# Patient Record
Sex: FEMALE | NOT HISPANIC OR LATINO | Employment: FULL TIME | URBAN - NONMETROPOLITAN AREA
[De-identification: names, ages, dates, MRNs, and addresses within clinical notes are randomized per-mention and may not be internally consistent; named-entity substitution may affect disease eponyms.]

---

## 2018-01-31 ENCOUNTER — DOCUMENTATION ONLY (OUTPATIENT)
Dept: FAMILY MEDICINE | Facility: OTHER | Age: 48
End: 2018-01-31

## 2019-10-18 ENCOUNTER — OFFICE VISIT (OUTPATIENT)
Dept: FAMILY MEDICINE | Facility: OTHER | Age: 49
End: 2019-10-18
Attending: NURSE PRACTITIONER

## 2019-10-18 VITALS
SYSTOLIC BLOOD PRESSURE: 149 MMHG | RESPIRATION RATE: 20 BRPM | HEART RATE: 97 BPM | DIASTOLIC BLOOD PRESSURE: 88 MMHG | HEIGHT: 65 IN | OXYGEN SATURATION: 98 % | WEIGHT: 210.7 LBS | BODY MASS INDEX: 35.1 KG/M2 | TEMPERATURE: 97.1 F

## 2019-10-18 DIAGNOSIS — R21 RASH: Primary | ICD-10-CM

## 2019-10-18 DIAGNOSIS — R03.0 ELEVATED BLOOD PRESSURE READING: ICD-10-CM

## 2019-10-18 LAB
SPECIMEN SOURCE: NORMAL
STREP GROUP A PCR: NOT DETECTED

## 2019-10-18 PROCEDURE — 99214 OFFICE O/P EST MOD 30 MIN: CPT | Performed by: PHYSICIAN ASSISTANT

## 2019-10-18 PROCEDURE — 87651 STREP A DNA AMP PROBE: CPT | Mod: ZL | Performed by: PHYSICIAN ASSISTANT

## 2019-10-18 ASSESSMENT — PATIENT HEALTH QUESTIONNAIRE - PHQ9: SUM OF ALL RESPONSES TO PHQ QUESTIONS 1-9: 10

## 2019-10-18 ASSESSMENT — MIFFLIN-ST. JEOR: SCORE: 1579.73

## 2019-10-18 NOTE — PATIENT INSTRUCTIONS
Rash to chest and neck  Discussed consideration of strep, contact dermatitis, allergy reaction  Strep PCR is pending we will call with results and recommendations  For rash - symptomatic treatments - moisturize, apply cool compress, avoid heat  OTC cetirizine 10 mg oral tablet, take once to twice daily, alternately can use benadryl as directed on label  OTC hydrocortisone to affected areas twice daily for up to 14 days  Elevated BP on exam today   For elevated BP will have you follow up in clinic for a recheck of rash and BP in 1 week  Seek immediate care for     Fever of 100.4 F (38 C) or higher, or as directed    Symptoms that don t get better, or get worse    New symptoms    Patient Education     Understanding Contact Dermatitis     A cool, moist compress can help reduce itching.     Contact dermatitis is a common type of skin rash. It s caused by something that touches the skin and makes it irritated and inflamed. It can occur on skin on any part of the body, such as the face, neck, hands, arms, and legs. Contact dermatitis is not spread from person to person.  Often, the reaction of contact dermatitis occurs 1 to 2 days after contact with the offending agent.  How to say it  ROBIN-tact [id number]   What causes contact dermatitis?  It s caused by something that irritates the skin, or that creates an allergic reaction on the skin. People can get contact dermatitis from many kinds of things. These include:    Plant oils in poison ivy, oak, and sumac    Chemicals in household , solvents, and glue    Chemicals in makeup, soap, laundry detergent, perfume, acne cream, and hair products    Certain medicines, such as neomycin, bacitracin, benzocaine, and thimerosal    Metals such as nickel, found in some jewelry and watch bands     The sticky material on the back of bandages and tape (adhesive)    Things that can cause tiny breaks in the skin, such as wood, fiberglass, metal tools, and plant thorns    Rubber  latex in surgical gloves and other medical supplies  Dermatitis can also be caused by the skin being damp for long periods of time. This can happen from washing your hands too often, or working with wet materials.  Symptoms of contact dermatitis  Symptoms can include skin that is:    Blistered    Burning    Cracked    Dry    Itchy    Painful    Red    Rough, thickened, and leathery    Swollen    Warm  The blisters may ooze fluid and form crusts.  Treatment for contact dermatitis  Treatment is done to help relieve itching and reduce inflammation. The rash should go away in a few days to a few weeks. Treatments include:    Cool, moist compress. Use a clean damp cloth. Put it on the area for 20 to 30 minutes, 5 to 6 times a day for the first 3 days.    Steroid cream or ointment. You can apply this medicine several times a day on clean skin.    Oral corticosteroid. Your healthcare provider may prescribe this medicine if you have severe skin symptoms on a large part of your body.  Your healthcare provider may give you a steroid injection instead of pills.    Oral antihistamine. This medicine can help reduce itching.    Colloidal oatmeal bath. Soaking in water with colloidal oatmeal can help soothe skin.    Plain cream, lotion, or ointment. Cream, lotion, or ointment without medicine can help to soothe and protect your skin.  Living with contact dermatitis  Talk with your healthcare provider about what may have caused your contact dermatitis. Patch testing may help you figure out what caused the rash so you can avoid further contact with it. Once you learn what caused your rash, make sure to avoid that substance. If your skin comes into contact with it again, make sure to wash your skin right away. If you can t avoid the substance, wear gloves or other protective clothing before you touch it. Or use a cream, lotion, or ointment to protect your skin.  When to call your healthcare provider  Call your healthcare provider  right away if you have any of these:    Fever of 100.4 F (38 C) or higher, or as directed    Symptoms that don t get better, or get worse    New symptoms   Date Last Reviewed: 5/1/2016 2000-2018 [employer]. [address] [place] [address], [place] [address]. All rights reserved. This information is not intended as a substitute for professional medical care. Always follow your healthcare professional's instructions.           Patient Education     What is High Blood Pressure?  High blood pressure (also called hypertension) is known as the  silent killer.  This is because most of the time it doesn t cause symptoms. In fact, many people don t know they have it until other problems develop. In most cases, high blood pressure often requires lifelong treatment.  Understanding blood pressure  The circulatory system is made up of the heart and blood vessels that carry blood through the body. Your heart is the pump for this system. With each heartbeat (contraction), the heart sends blood out through large blood vessels called arteries. Blood pressure is a measure of how hard the moving blood pushes against the walls of the arteries.  High blood pressure can harm your health  In a healthy blood vessel, the blood moves smoothly through the vessel and puts normal pressure on the vessel walls.       High blood pressure occurs when blood pushes too hard against artery walls. This causes damage to the artery walls and then the formation of scar tissue as it heals. This makes the arteries stiff and weak. Plaque sticks to the scarred tissue narrowing and hardening the arteries. High blood pressure also causes your heart to work harder to get blood out to the body. High blood pressure raises your risk of heart attack, also known as acute myocardial infarction, or AMI, heart failure, and stroke. It can also lead to kidney disease, and blindness. In general, if you have high blood pressure, keeping your blood pressure below  130/80 mmHg may help prevent these problems. Your healthcare provider may prescribe medicine to help control blood pressure if lifestyle changes are not enough.  It's important to know your blood pressure numbers. Blood pressure measurements are given as 2 numbers. Systolic blood pressure is the upper number. This is the pressure when the heart contracts. Diastolic blood pressure is the lower number. This is the pressure when the heart relaxes between beats.  Blood pressure is categorized as normal, elevated, or stage 1 or stage 2 high blood pressure:    Normal blood pressure is systolic of less than 120 and diastolic of less than 80 (120/80)    Elevated blood pressure is systolic of 120 to 129 and diastolic less than 80    Stage 1 high blood pressure is systolic is 130 to 139 or diastolic between 80 to 89    Stage 2 high blood pressure is when systolic is 140 or higher or the diastolic is 90 or higher  High blood pressure is diagnosed when multiple, separate readings show blood pressure above 130/80 mmHg. Talk with your healthcare provider if you have questions or concerns about your blood pressure readings.  Measuring blood pressure  An example of a blood pressure measurement is 120/70 (120 over 70). The top number is the pressure of blood against the artery walls during a heartbeat (systolic). The bottom number is the pressure of blood against artery walls between heartbeats (diastolic). Talk with your healthcare provider to find out what your blood pressure goals should be.   Controlling blood pressure  If your blood pressure is too high, work with your doctor on a plan for lowering it. Below are steps you can take that will help lower your blood pressure.    Choose heart-healthy foods. Eating healthier meals helps you control your blood pressure. Ask your doctor about the DASH eating plan. This plan helps reduce blood pressure by limiting the amount of sodium (salt) you have in your diet. DASH also encourages  "eating plenty of fruits and vegetables, low-fat or non-fat dairy, whole-grains, and foods high in fiber, and low in fat. This also provides an enhanced amount of potassium which can also help lower blood pressure.    Reduce sodium. Reducing sodium in your diet reduces fluid retention. Fluid retention caused by too much salt increases blood volume and blood pressure. The American Heart Association s (AHA) \"ideal\" sodium intake recommendation is 1,500 milligrams per day.  However, since American's eat so much salt, the AHA says a positive change can occur by cutting back to even 2,400 milligrams of sodium a day.    Maintain a healthy weight. Being overweight makes you more likely to have high blood pressure. Losing excess weight helps lower blood pressure.    Exercise regularly. Daily exercise helps your heart and blood vessels work better and stay healthier. It can help lower your blood pressure.    Stop smoking. Smoking increases blood pressure and damages blood vessels.    Limit alcohol. Drinking too much alcohol can raise blood pressure. Men should have no more than 2 drinks a day. Women should have no more than 1. (A drink is equal to 1 beer, or a small glass of wine, or a shot of liquor.)    Control stress. Stress makes your heart work harder and beat faster. Controlling stress helps you control your blood pressure.  Facts about high blood pressure    Feeling OK does not mean that blood pressure is under control. Likewise, feeling bad doesn t mean it s out of control. The only way to know for sure is to check your pressure regularly.    Medicine is only one part of controlling high blood pressure. You also need to manage your weight, get regular exercise, and adjust your eating habits.    High blood pressure is usually a lifelong problem. But it can be controlled with healthy lifestyle changes and medicine.    Hypertension is not the same as stress. Although stress may be a factor in high blood pressure, it s " only one part of the story.    Blood pressure medicines need to be taken every day. Stopping suddenly may cause a dangerous increase in pressure.   Date Last Reviewed: 4/27/2016 2000-2018 [employer]. [address] [place] [address], [place] [address]. All rights reserved. This information is not intended as a substitute for professional medical care. Always follow your healthcare professional's instructions.

## 2019-10-18 NOTE — NURSING NOTE
"Chief Complaint   Patient presents with     Derm Problem   Rash on chest since Tuesday.      Initial BP (!) 168/90   Pulse 97   Temp 97.1  F (36.2  C) (Tympanic)   Resp 20   Ht 1.64 m (5' 4.57\")   Wt 95.6 kg (210 lb 11.2 oz)   LMP 07/04/2019   SpO2 98%   BMI 35.53 kg/m   Estimated body mass index is 35.53 kg/m  as calculated from the following:    Height as of this encounter: 1.64 m (5' 4.57\").    Weight as of this encounter: 95.6 kg (210 lb 11.2 oz).  Medication Reconciliation: complete    Letha Jackson LPN  "

## 2019-10-18 NOTE — PROGRESS NOTES
"HPI:    Julia Weaver is a 49 year old female who presents to clinic today for evaluation of rash on her chest/neck  Onset 4 days ago, course is unchanged  Associated symptoms: erythematous rash, itchy. Dry skin in the crease of her nose  Precipitating: nothing she can think of as far as exposures or changes in eating, medications, supplements.  She does wear her blue tooth head phones around her neck, she has been using these for many months without reaction  No known exposure to new cosmetics, chemicals, foods, medications, supplements, plants, pets, environments    She has some dizziness in AM's.   BP is elevated. Patient does not take anything to treat this. She had a similar episode of high Blood pressure 3 years ago.     Past Medical History:   Diagnosis Date     Personal history of other medical treatment (CODE)     No Comments Provided       Past Surgical History:   Procedure Laterality Date     DENTAL SURGERY      No Comments Provided     TONSILLECTOMY, ADENOIDECTOMY, COMBINED      No Comments Provided     TYMPANOSTOMY, LOCAL/TOPICAL ANESTHESIA      No Comments Provided         No current outpatient medications on file.       No Known Allergies    ROS:  General: feels well, no fever  Skin: POSITIVE for rash per HPI  HENT: negative  Respiratory: negative  Abdomen: negative  Musculoskleletal: negative  Neurological: negative    EXAM:  Vitals:    10/18/19 1431 10/18/19 1525   BP: (!) 168/90 (!) 149/88   Pulse: 97    Resp: 20    Temp: 97.1  F (36.2  C)    TempSrc: Tympanic    SpO2: 98%    Weight: 95.6 kg (210 lb 11.2 oz)    Height: 1.64 m (5' 4.57\")      General appearance: well appearing female, in no acute distress  Head: normocephalic, atraumatic  Ears: TM's with cone of light, no erythema, canals clear bilaterally  Eyes: conjunctivae normal, SIGRID, EOMI  Nose: dry skin paranasal, mild congestion. No sinus tenderness  Orophayrnx: moist mucous membranes, moderate oral pharynx erythema, s/p tonsillectomy  Neck: " supple with mild adenopathy  Respiratory: clear to auscultation bilaterally  Cardiac: RRR with no murmurs  Dermatological: erythematous rash on chest and neck with small 1-2 mm scattered erythematous macules and papules  Psychological: normal affect, alert and pleasant    Results for orders placed or performed in visit on 10/18/19   Group A Streptococcus PCR Throat Swab   Result Value Ref Range    Specimen Description Throat     Strep Group A PCR Not Detected NDET^Not Detected         ASSESSMENT AND PLAN:    1. Rash    2. Elevated blood pressure reading      Rash to chest and neck  Discussed consideration of strep, contact dermatitis, allergy reaction, heat rash  Throat was also erythematous so obtained Strep PCR  No known new exposures foods, cosmetics, lotions medications etc. She does wear her head phones around her neck - but this is not a new change.   Strep PCR is negative, notified through nursing pool today  Will treat rash symptomatically -  moisturize, apply cool compress, avoid heat  OTC cetirizine 10 mg oral tablet, take once to twice daily, alternately can use benadryl as directed on label  OTC hydrocortisone to affected areas twice daily for up to 14 days  Elevated BP on exam today, on recheck this came down to 149/88  For elevated BP, patient has a blood pressure cuff at home and will check her BP 3 x weekly. Discussed goal of < 140/<90.   Follow up in clinic for a recheck of rash and BP in 1 week if symptoms persist or worsen  Patient received verbal and written instruction including review of warning signs    Estrella Krishnamurthy PA-C on 10/18/2019 at 5:55 PM    Estrella Krishnamurthy PA-C on 12/6/2019 at 10:19 AM

## 2019-11-26 ENCOUNTER — RESULTS ONLY (OUTPATIENT)
Dept: LAB | Age: 49
End: 2019-11-26

## 2019-11-26 LAB
ALBUMIN UR-MCNC: NEGATIVE MG/DL
ANION GAP SERPL CALCULATED.3IONS-SCNC: 10 MMOL/L (ref 3–14)
APPEARANCE UR: CLEAR
BASOPHILS # BLD AUTO: 0.1 10E9/L (ref 0–0.2)
BASOPHILS NFR BLD AUTO: 0.6 %
BILIRUB UR QL STRIP: NEGATIVE
BUN SERPL-MCNC: 10 MG/DL (ref 7–25)
CALCIUM SERPL-MCNC: 9.5 MG/DL (ref 8.6–10.3)
CHLORIDE SERPL-SCNC: 99 MMOL/L (ref 98–107)
CHOLEST SERPL-MCNC: 289 MG/DL
CO2 SERPL-SCNC: 28 MMOL/L (ref 21–31)
COLOR UR AUTO: YELLOW
CREAT SERPL-MCNC: 0.85 MG/DL (ref 0.6–1.2)
DIFFERENTIAL METHOD BLD: NORMAL
EOSINOPHIL # BLD AUTO: 0.2 10E9/L (ref 0–0.7)
EOSINOPHIL NFR BLD AUTO: 1.9 %
ERYTHROCYTE [DISTWIDTH] IN BLOOD BY AUTOMATED COUNT: 13.9 % (ref 10–15)
GFR SERPL CREATININE-BSD FRML MDRD: 71 ML/MIN/{1.73_M2}
GLUCOSE SERPL-MCNC: 148 MG/DL (ref 70–105)
GLUCOSE UR STRIP-MCNC: NEGATIVE MG/DL
HCT VFR BLD AUTO: 42.5 % (ref 35–47)
HDLC SERPL-MCNC: 44 MG/DL (ref 23–92)
HGB BLD-MCNC: 14.5 G/DL (ref 11.7–15.7)
HGB UR QL STRIP: NEGATIVE
IMM GRANULOCYTES # BLD: 0 10E9/L (ref 0–0.4)
IMM GRANULOCYTES NFR BLD: 0.4 %
KETONES UR STRIP-MCNC: NEGATIVE MG/DL
LDLC SERPL CALC-MCNC: 185 MG/DL
LEUKOCYTE ESTERASE UR QL STRIP: NEGATIVE
LYMPHOCYTES # BLD AUTO: 1.5 10E9/L (ref 0.8–5.3)
LYMPHOCYTES NFR BLD AUTO: 18.9 %
MCH RBC QN AUTO: 30.1 PG (ref 26.5–33)
MCHC RBC AUTO-ENTMCNC: 34.1 G/DL (ref 31.5–36.5)
MCV RBC AUTO: 88 FL (ref 78–100)
MONOCYTES # BLD AUTO: 0.6 10E9/L (ref 0–1.3)
MONOCYTES NFR BLD AUTO: 7.7 %
NEUTROPHILS # BLD AUTO: 5.5 10E9/L (ref 1.6–8.3)
NEUTROPHILS NFR BLD AUTO: 70.5 %
NITRATE UR QL: NEGATIVE
NONHDLC SERPL-MCNC: 245 MG/DL
PH UR STRIP: 7 PH (ref 5–9)
PLATELET # BLD AUTO: 232 10E9/L (ref 150–450)
POTASSIUM SERPL-SCNC: 3.5 MMOL/L (ref 3.5–5.1)
RBC # BLD AUTO: 4.82 10E12/L (ref 3.8–5.2)
SODIUM SERPL-SCNC: 137 MMOL/L (ref 134–144)
SOURCE: NORMAL
SP GR UR STRIP: <1.005 (ref 1–1.03)
TRIGL SERPL-MCNC: 301 MG/DL
UROBILINOGEN UR STRIP-ACNC: 0.2 EU/DL (ref 0.2–1)
WBC # BLD AUTO: 7.8 10E9/L (ref 4–11)

## 2020-03-11 ENCOUNTER — HEALTH MAINTENANCE LETTER (OUTPATIENT)
Age: 50
End: 2020-03-11

## 2020-12-27 ENCOUNTER — HEALTH MAINTENANCE LETTER (OUTPATIENT)
Age: 50
End: 2020-12-27

## 2021-03-06 ENCOUNTER — HEALTH MAINTENANCE LETTER (OUTPATIENT)
Age: 51
End: 2021-03-06

## 2021-03-09 ENCOUNTER — MEDICAL CORRESPONDENCE (OUTPATIENT)
Dept: HEALTH INFORMATION MANAGEMENT | Facility: OTHER | Age: 51
End: 2021-03-09

## 2021-03-09 ENCOUNTER — APPOINTMENT (OUTPATIENT)
Dept: LAB | Facility: OTHER | Age: 51
End: 2021-03-09
Attending: FAMILY MEDICINE

## 2021-03-09 ENCOUNTER — RESULTS ONLY (OUTPATIENT)
Dept: LAB | Age: 51
End: 2021-03-09

## 2021-03-09 LAB
ALBUMIN SERPL-MCNC: 4.8 G/DL (ref 3.5–5.7)
ALP SERPL-CCNC: 97 U/L (ref 34–104)
ALT SERPL W P-5'-P-CCNC: 28 U/L (ref 7–52)
ANION GAP SERPL CALCULATED.3IONS-SCNC: 10 MMOL/L (ref 3–14)
AST SERPL W P-5'-P-CCNC: 13 U/L (ref 13–39)
BILIRUB SERPL-MCNC: 0.6 MG/DL (ref 0.3–1)
BUN SERPL-MCNC: 15 MG/DL (ref 7–25)
CALCIUM SERPL-MCNC: 10.1 MG/DL (ref 8.6–10.3)
CHLORIDE SERPL-SCNC: 96 MMOL/L (ref 98–107)
CHOLEST SERPL-MCNC: 330 MG/DL
CO2 SERPL-SCNC: 27 MMOL/L (ref 21–31)
CREAT SERPL-MCNC: 0.89 MG/DL (ref 0.6–1.2)
GFR SERPL CREATININE-BSD FRML MDRD: 67 ML/MIN/{1.73_M2}
GLUCOSE SERPL-MCNC: 295 MG/DL (ref 70–105)
HBA1C MFR BLD: 8.7 % (ref 4–6)
HDLC SERPL-MCNC: 50 MG/DL (ref 23–92)
LDLC SERPL CALC-MCNC: 220 MG/DL
NONHDLC SERPL-MCNC: 280 MG/DL
POTASSIUM SERPL-SCNC: 4.1 MMOL/L (ref 3.5–5.1)
PROT SERPL-MCNC: 7.9 G/DL (ref 6.4–8.9)
SODIUM SERPL-SCNC: 133 MMOL/L (ref 134–144)
TRIGL SERPL-MCNC: 301 MG/DL

## 2021-03-10 DIAGNOSIS — E11.9 TYPE 2 DIABETES MELLITUS WITHOUT COMPLICATION, WITHOUT LONG-TERM CURRENT USE OF INSULIN (H): Primary | ICD-10-CM

## 2021-04-25 ENCOUNTER — HEALTH MAINTENANCE LETTER (OUTPATIENT)
Age: 51
End: 2021-04-25

## 2021-06-19 ENCOUNTER — HEALTH MAINTENANCE LETTER (OUTPATIENT)
Age: 51
End: 2021-06-19

## 2021-09-14 ENCOUNTER — APPOINTMENT (OUTPATIENT)
Dept: LAB | Facility: OTHER | Age: 51
End: 2021-09-14
Attending: FAMILY MEDICINE

## 2021-09-14 ENCOUNTER — LAB REQUISITION (OUTPATIENT)
Dept: LAB | Facility: OTHER | Age: 51
End: 2021-09-14

## 2021-09-14 DIAGNOSIS — E11.9 TYPE 2 DIABETES MELLITUS WITHOUT COMPLICATIONS (H): ICD-10-CM

## 2021-09-14 LAB — HBA1C MFR BLD: 6.1 % (ref 4–6.2)

## 2021-09-14 PROCEDURE — 83036 HEMOGLOBIN GLYCOSYLATED A1C: CPT | Performed by: FAMILY MEDICINE

## 2021-09-14 PROCEDURE — 36415 COLL VENOUS BLD VENIPUNCTURE: CPT | Performed by: FAMILY MEDICINE

## 2021-10-09 ENCOUNTER — HEALTH MAINTENANCE LETTER (OUTPATIENT)
Age: 51
End: 2021-10-09

## 2021-11-30 ENCOUNTER — LAB REQUISITION (OUTPATIENT)
Dept: LAB | Facility: OTHER | Age: 51
End: 2021-11-30

## 2021-11-30 LAB
ANION GAP SERPL CALCULATED.3IONS-SCNC: 9 MMOL/L (ref 3–14)
BUN SERPL-MCNC: 15 MG/DL (ref 7–25)
CALCIUM SERPL-MCNC: 10.2 MG/DL (ref 8.6–10.3)
CHLORIDE BLD-SCNC: 99 MMOL/L (ref 98–107)
CHOLEST SERPL-MCNC: 215 MG/DL
CO2 SERPL-SCNC: 28 MMOL/L (ref 21–31)
CREAT SERPL-MCNC: 0.84 MG/DL (ref 0.6–1.2)
FASTING STATUS PATIENT QL REPORTED: NO
GFR SERPL CREATININE-BSD FRML MDRD: 81 ML/MIN/1.73M2
GLUCOSE BLD-MCNC: 128 MG/DL (ref 70–105)
HBA1C MFR BLD: 7 % (ref 4–6.2)
HDLC SERPL-MCNC: 51 MG/DL (ref 23–92)
LDLC SERPL CALC-MCNC: 122 MG/DL
NONHDLC SERPL-MCNC: 164 MG/DL
POTASSIUM BLD-SCNC: 3.8 MMOL/L (ref 3.5–5.1)
SODIUM SERPL-SCNC: 136 MMOL/L (ref 134–144)
TRIGL SERPL-MCNC: 212 MG/DL

## 2021-11-30 PROCEDURE — 83036 HEMOGLOBIN GLYCOSYLATED A1C: CPT | Performed by: FAMILY MEDICINE

## 2021-11-30 PROCEDURE — 36415 COLL VENOUS BLD VENIPUNCTURE: CPT | Performed by: FAMILY MEDICINE

## 2021-11-30 PROCEDURE — 80061 LIPID PANEL: CPT | Performed by: FAMILY MEDICINE

## 2021-11-30 PROCEDURE — 80048 BASIC METABOLIC PNL TOTAL CA: CPT | Performed by: FAMILY MEDICINE

## 2022-03-26 ENCOUNTER — HEALTH MAINTENANCE LETTER (OUTPATIENT)
Age: 52
End: 2022-03-26

## 2022-05-21 ENCOUNTER — HEALTH MAINTENANCE LETTER (OUTPATIENT)
Age: 52
End: 2022-05-21

## 2022-07-16 ENCOUNTER — HEALTH MAINTENANCE LETTER (OUTPATIENT)
Age: 52
End: 2022-07-16

## 2022-08-29 ENCOUNTER — ALLIED HEALTH/NURSE VISIT (OUTPATIENT)
Dept: FAMILY MEDICINE | Facility: OTHER | Age: 52
End: 2022-08-29
Payer: COMMERCIAL

## 2022-08-29 DIAGNOSIS — Z23 NEED FOR VACCINATION: Primary | ICD-10-CM

## 2022-08-29 DIAGNOSIS — Z23 ENCOUNTER FOR IMMUNIZATION: Primary | ICD-10-CM

## 2022-08-29 PROCEDURE — 90471 IMMUNIZATION ADMIN: CPT

## 2022-08-29 PROCEDURE — 90715 TDAP VACCINE 7 YRS/> IM: CPT

## 2022-08-29 NOTE — PROGRESS NOTES
Pt is coming in this afternoon for a Boostrix injection. Pt has no records of immunizations available. Pt was last seen with Dr. Raymond. Dr. Raymond is out today, sending request to provider in [facility] [address] for review. Thank you     Yasmine Duffy RN on 8/29/2022 at 10:37 AM

## 2022-08-29 NOTE — PROGRESS NOTES
Immunization Documentation  Verified patient's first and last name, and . Stated reason for visit today is to receive BROOSTRIX vaccine. Accompained to clinic visit with SELF. Denied any concerns with previous immunizations. Allergies reviewed. VIS handout(s) reviewed and given to take home. VACCINES prepared and administered per standing order. Administration documented in IMMUNIZATIONS (see flowsheet and order for further information).  Instructed to wait in lobby for 15 minutes post-injection and notify staff immediately of any reaction.     Yasmine Duffy RN ....................  2022   3:47 PM

## 2022-09-04 ENCOUNTER — OFFICE VISIT (OUTPATIENT)
Dept: FAMILY MEDICINE | Facility: OTHER | Age: 52
End: 2022-09-04
Attending: FAMILY MEDICINE
Payer: COMMERCIAL

## 2022-09-04 VITALS
RESPIRATION RATE: 18 BRPM | TEMPERATURE: 98 F | WEIGHT: 185.38 LBS | OXYGEN SATURATION: 97 % | SYSTOLIC BLOOD PRESSURE: 120 MMHG | HEART RATE: 92 BPM | DIASTOLIC BLOOD PRESSURE: 66 MMHG | BODY MASS INDEX: 31.26 KG/M2

## 2022-09-04 DIAGNOSIS — H01.135 ECZEMATOUS DERMATITIS OF UPPER AND LOWER EYELIDS OF BOTH EYES: Primary | ICD-10-CM

## 2022-09-04 DIAGNOSIS — H01.131 ECZEMATOUS DERMATITIS OF UPPER AND LOWER EYELIDS OF BOTH EYES: Primary | ICD-10-CM

## 2022-09-04 DIAGNOSIS — H01.134 ECZEMATOUS DERMATITIS OF UPPER AND LOWER EYELIDS OF BOTH EYES: Primary | ICD-10-CM

## 2022-09-04 DIAGNOSIS — H01.132 ECZEMATOUS DERMATITIS OF UPPER AND LOWER EYELIDS OF BOTH EYES: Primary | ICD-10-CM

## 2022-09-04 PROCEDURE — 99213 OFFICE O/P EST LOW 20 MIN: CPT | Performed by: FAMILY MEDICINE

## 2022-09-04 RX ORDER — LISINOPRIL 10 MG/1
10 TABLET ORAL DAILY
COMMUNITY
Start: 2022-07-11 | End: 2022-10-12

## 2022-09-04 RX ORDER — TRIAMCINOLONE ACETONIDE 1 MG/G
CREAM TOPICAL 2 TIMES DAILY
Qty: 15 G | Refills: 1 | Status: SHIPPED | OUTPATIENT
Start: 2022-09-04 | End: 2022-10-28

## 2022-09-04 RX ORDER — ROSUVASTATIN CALCIUM 10 MG/1
10 TABLET, COATED ORAL DAILY
COMMUNITY
Start: 2022-07-11 | End: 2022-10-14

## 2022-09-04 ASSESSMENT — PAIN SCALES - GENERAL: PAINLEVEL: NO PAIN (0)

## 2022-09-04 NOTE — NURSING NOTE
Patient presents to clinic experiencing red, itchy, swollen bilateral eyes since this morning.    Medication Rec Complete  Ronel Bundy LPN............9/4/2022 1:42 PM

## 2022-09-04 NOTE — PROGRESS NOTES
(H01.131,  H01.132,  H01.135,  H01.134) Eczematous dermatitis of upper and lower eyelids of both eyes  (primary encounter diagnosis)  Comment:   Plan: triamcinolone (KENALOG) 0.1 % external cream              CHIEF COMPLAINT    Redness of eyelids.      HISTORY    Patient is developed eyelid redness bilaterally over the last 2 to 3 days.  She states that her skin feels kind of tight and dry in these areas.  She tried a topical over-the-counter product which did not help too much.    She is not noticing redness of the conjunctiva or mattering of her eyes.  She has a little rash along the necklace line also.    It sounds like she has a history of somewhat sensitive skin.      REVIEW OF SYSTEMS    Unremarkable except as above.      EXAM  /66 (BP Location: Right arm, Patient Position: Sitting, Cuff Size: Adult Regular)   Pulse 92   Temp 98  F (36.7  C) (Tympanic)   Resp 18   Wt 84.1 kg (185 lb 6 oz)   LMP  (LMP Unknown)   SpO2 97%   Breastfeeding No   BMI 31.26 kg/m      Eyelid dermatitis involving both eyes, upper and lower lids with redness and a little bit of scaliness.  No vesicles or pustules.    Conjunctiva is quiet.

## 2022-09-17 ENCOUNTER — HEALTH MAINTENANCE LETTER (OUTPATIENT)
Age: 52
End: 2022-09-17

## 2022-10-12 DIAGNOSIS — I10 BENIGN ESSENTIAL HYPERTENSION: Primary | ICD-10-CM

## 2022-10-12 NOTE — TELEPHONE ENCOUNTER
[id number] of [place] sent Rx request for the following:    Pt out of medication.     Requested Prescriptions   Pending Prescriptions Disp Refills     lisinopril (ZESTRIL) 10 MG tablet [Pharmacy Med Name: LISINOPRIL 10MG TABLET] 90 tablet 4     Sig: TAKE 1 TABLET BY MOUTH DAILY   Historically reported.    Last Office Visit:              7/2/16   Future Office visit:           None    Called and spoke to Patient after verifying last name and date of birth. Pt states she has been without health insurance and has been receiving prescriptions through [facility]. Pt recently got insurance and would like to establish care with Imelda Sexton. Pt states she took her last pill this morning and verbalized plan to reach out to [id number], for emergency supply. Pt transferred to scheduling, to set up appointment. Pt requesting refill to get her through until appointment:    Next 5 appointments (look out 90 days)    Oct 28, 2022  7:40 AM  Office Visit with Imelda Sexton PA-C  [facility] ([facility] ) [address]  [place] [address]  [phone]        Ronel Oneil RN .............. 10/12/2022  2:29 PM

## 2022-10-13 DIAGNOSIS — I10 BENIGN ESSENTIAL HYPERTENSION: ICD-10-CM

## 2022-10-13 DIAGNOSIS — E11.9 TYPE 2 DIABETES MELLITUS WITHOUT COMPLICATION, UNSPECIFIED WHETHER LONG TERM INSULIN USE (H): Primary | ICD-10-CM

## 2022-10-13 RX ORDER — LISINOPRIL 10 MG/1
TABLET ORAL
Qty: 30 TABLET | Refills: 0 | Status: SHIPPED | OUTPATIENT
Start: 2022-10-13 | End: 2022-10-28

## 2022-10-14 RX ORDER — ROSUVASTATIN CALCIUM 10 MG/1
TABLET, COATED ORAL
Qty: 30 TABLET | Refills: 0 | Status: SHIPPED | OUTPATIENT
Start: 2022-10-14 | End: 2022-10-28

## 2022-10-14 NOTE — TELEPHONE ENCOUNTER
[id number] of [place] sent Rx request for the following:      Requested Prescriptions   Pending Prescriptions Disp Refills     rosuvastatin (CRESTOR) 10 MG tablet [Pharmacy Med Name: ROSUVASTATIN 10MG TABLET] 90 tablet 4     Sig: TAKE 1 TABLET BY MOUTH EVERY DAY   Historically reported    Last Office Visit:              7/2/16   Future Office visit:           None     Called and spoke to Patient after verifying last name and date of birth. Pt states she has been without health insurance and has been receiving prescriptions through [facility]. Pt recently got insurance and would like to establish care with Imelda Sexton. Pt states she took her last pill this morning and verbalized plan to reach out to [id number], for emergency supply. Pt transferred to scheduling, to set up appointment. Pt requesting refill to get her through until appointment:         Next 5 appointments (look out 90 days)    Oct 28, 2022  7:40 AM  Office Visit with Imelda Sexton PA-C  [facility] ([facility] ) [address]  [place] [address]  [phone]     Ronel Oneil RN .............. 10/14/2022  2:49 PM

## 2022-10-27 NOTE — PROGRESS NOTES
Assessment & Plan     1. Routine history and physical examination of adult  Updated preventative exams as outlined below, Pap smear pending.  Declined updating immunizations.  Vitals and exam stable.  Encouraged healthy diet and exercise.    2. Cervical cancer screening  Pap smear pending, notify the results.  - Pap Screen Thin Prep    3. Type 2 diabetes mellitus without complication, unspecified whether long term insulin use (H)  Under very poor control.  A1c returned at 11.2 today.  We will need to significantly increase metformin dose over the next few weeks.  Subsequently ending up on 1000 twice daily.  Recheck in 1 month or sooner if needed.  Recheck A1c in 3 months and if still elevated may need to add additional medications.  Patient will also focus on significant healthy lifestyle changes in the meantime.  - Basic Metabolic Panel; Future  - Hemoglobin A1c; Future  - Lipid Panel; Future  - Albumin Random Urine Quantitative with Creat Ratio; Future  - Basic Metabolic Panel  - Hemoglobin A1c  - Lipid Panel  - Albumin Random Urine Quantitative with Creat Ratio  - rosuvastatin (CRESTOR) 10 MG tablet; Take 1 tablet (10 mg) by mouth daily  Dispense: 90 tablet; Refill: 3  - metFORMIN (GLUCOPHAGE) 500 MG tablet; Start with 500 mg once daily X 1 week, increase to 500 mg twice daily X 1 week, then 1000 mg twice daily  Dispense: 120 tablet; Refill: 3    4. Benign essential hypertension  Well-controlled, refilled lisinopril as below.  - lisinopril (ZESTRIL) 10 MG tablet; Take 1 tablet (10 mg) by mouth daily  Dispense: 90 tablet; Refill: 3    5. Chronic right-sided low back pain with right-sided sciatica  Lumbar x-rays showing mild arthritis change. Options discussed including PT, additional evaluation, medication management, etc. Patient interested in packet of at home stretches/exercises which was given. If minimal improvement consider PT or additional evaluation of symptoms.   - XR Lumbar Spine 2/3 Views;  Future    6. Encounter for screening mammogram for breast cancer  - MA Screen Bilateral w/Remigio; Future    7. Special screening for malignant neoplasms, colon  - COLOGUARD([employer])      Return in about 4 weeks (around 11/25/2022), or if symptoms worsen or fail to improve.    Imelda Sexton PA-C  [facility]    Subjective   Julia is a 52 year old, presenting for the following health issues:  Physical      Healthy Habits:     Getting at least 3 servings of Calcium per day:  NO    Bi-annual eye exam:  Yes    Dental care twice a year:  NO    Sleep apnea or symptoms of sleep apnea:  None    Diet:  Other    Duration of exercise:  Other    Taking medications regularly:  No    Barriers to taking medications:  Other    Medication side effects:  Other    PHQ-2 Total Score: 0    Additional concerns today:  No     Here for annual physical.  Patient is due for updating lab work due to history of hypertension, diabetes, elevated cholesterol.  Continues on metformin once daily for diabetic management.  Most recent A1c completed 1 year ago and was 7.0.  Patient reports that she has prescribed metformin 500 mg twice daily but currently takes only 250 mg once daily.  Does take the lisinopril and rosuvastatin as prescribed.  Has been working more towards healthy lifestyle.    Has also been struggling with some chronic right-sided low back pain that radiates down her right leg.  Does have some swelling in her right leg that is worse at the end of the day.  Reports that she has a history of degenerative disease in her back that was diagnosed many years ago.  Unable to review records, imaging.  Would like to update imaging today.  No associated fever/chills, numbness or tingling, saddle anesthesia, loss of bowel or bladder control, foot drop.    PAST MEDICAL HISTORY:   Past Medical History:   Diagnosis Date     Personal history of other medical treatment (CODE)     No Comments Provided       PAST SURGICAL  "HISTORY:   Past Surgical History:   Procedure Laterality Date     DENTAL SURGERY      No Comments Provided     TONSILLECTOMY, ADENOIDECTOMY, COMBINED      No Comments Provided     TYMPANOSTOMY, LOCAL/TOPICAL ANESTHESIA      No Comments Provided       FAMILY HISTORY: History reviewed. No pertinent family history.    SOCIAL HISTORY:   Social History     Tobacco Use     Smoking status: Former     Packs/day: 1.00     Types: Cigarettes     Smokeless tobacco: Never   Substance Use Topics     Alcohol use: No      No Known Allergies  Current Outpatient Medications   Medication     lisinopril (ZESTRIL) 10 MG tablet     metFORMIN (GLUCOPHAGE) 500 MG tablet     rosuvastatin (CRESTOR) 10 MG tablet     No current facility-administered medications for this visit.         Review of Systems   Constitutional: Negative for chills and fever.   HENT: Negative for congestion, ear pain, hearing loss and sore throat.    Eyes: Negative for pain and visual disturbance.   Respiratory: Negative for cough and shortness of breath.    Cardiovascular: Positive for peripheral edema. Negative for chest pain and palpitations.   Gastrointestinal: Positive for abdominal pain and constipation. Negative for diarrhea, heartburn, hematochezia and nausea.   Breasts:  Negative for tenderness, breast mass and discharge.   Genitourinary: Positive for frequency. Negative for dysuria, genital sores, hematuria, pelvic pain, urgency, vaginal bleeding and vaginal discharge.   Musculoskeletal: Positive for arthralgias and myalgias. Negative for joint swelling.   Skin: Negative for rash.   Neurological: Negative for dizziness, weakness, headaches and paresthesias.   Psychiatric/Behavioral: Positive for mood changes. The patient is not nervous/anxious.       Per HPI        Objective    /74   Pulse 105   Temp 97.2  F (36.2  C)   Resp 14   Ht 1.632 m (5' 4.25\")   Wt 80.4 kg (177 lb 3.2 oz)   LMP  (LMP Unknown)   SpO2 100%   Breastfeeding No   BMI 30.18 " kg/m    Body mass index is 30.18 kg/m .  Physical Exam   General: Pleasant, in no apparent distress.  Cardiovascular: Regular rate and rhythm with S1 equal to S2. No murmurs, friction rubs, or gallops.   Respiratory: Lungs are resonant and clear to auscultation bilaterally. No wheezes, crackles, or rhonchi.  Musculoskeletal: Back is straight, no tenderness to palpation of paraspinal and paravertebral muscles. Full ROM of back, neck, upper and lower extremities.  Genitourinary Exam Female: External genitalia, vulva and vagina are without lesions, masses, or ulcerations. Speculum exam reveals normal appearing cervix. Bimanual exam reveals normal uterus and adnexa with no masses, nontender urethra and bladder. No cervical motion tenderness. Pap smear obtained.   Neurologic Exam: Normal gross motor, tone coordination and no visible tremor.  Skin: No jaundice, pallor, rashes, or lesions.  Psych: Appropriate mood and affect.    Results for orders placed or performed in visit on 10/28/22   Extra Tube     Status: None    Narrative    The following orders were created for panel order Extra Tube.  Procedure                               Abnormality         Status                     ---------                               -----------         ------                     Extra Serum Separator Tu...[[id number]]                      Final result                 Please view results for these tests on the individual orders.   Extra Serum Separator Tube (SST)     Status: None   Result Value Ref Range    Hold Specimen [facility]    Basic Metabolic Panel     Status: Abnormal   Result Value Ref Range    Sodium 134 134 - 144 mmol/L    Potassium 4.4 3.5 - 5.1 mmol/L    Chloride 99 98 - 107 mmol/L    Carbon Dioxide (CO2) 27 21 - 31 mmol/L    Anion Gap 8 3 - 14 mmol/L    Urea Nitrogen 14 7 - 25 mg/dL    Creatinine 0.94 0.60 - 1.20 mg/dL    Calcium 9.5 8.6 - 10.3 mg/dL    Glucose 348 (H) 70 - 105 mg/dL    GFR Estimate 73 >60 mL/min/1.73m2    Hemoglobin A1c     Status: Abnormal   Result Value Ref Range    Hemoglobin A1C 11.2 (H) 4.0 - 6.2 %   Lipid Panel     Status: Abnormal   Result Value Ref Range    Cholesterol 161 <200 mg/dL    Triglycerides 169 (H) <150 mg/dL    Direct Measure HDL 30 23 - 92 mg/dL    LDL Cholesterol Calculated 97 <=100 mg/dL    Non HDL Cholesterol 131 (H) <130 mg/dL    Patient Fasting > 8hrs? Yes     Narrative    Cholesterol  Desirable:  <200 mg/dL    Triglycerides  Normal:  Less than 150 mg/dL  Borderline High:  150-199 mg/dL  High:  200-499 mg/dL  Very High:  Greater than or equal to 500 mg/dL    Direct Measure HDL  Female:  Greater than or equal to 50 mg/dL   Male:  Greater than or equal to 40 mg/dL    LDL Cholesterol  Desirable:  <100mg/dL  Above Desirable:  100-129 mg/dL   Borderline High:  130-159 mg/dL   High:  160-189 mg/dL   Very High:  >= 190 mg/dL    Non HDL Cholesterol  Desirable:  130 mg/dL  Above Desirable:  130-159 mg/dL  Borderline High:  160-189 mg/dL  High:  190-219 mg/dL  Very High:  Greater than or equal to 220 mg/dL

## 2022-10-28 ENCOUNTER — OFFICE VISIT (OUTPATIENT)
Dept: FAMILY MEDICINE | Facility: OTHER | Age: 52
End: 2022-10-28
Attending: PHYSICIAN ASSISTANT
Payer: COMMERCIAL

## 2022-10-28 ENCOUNTER — APPOINTMENT (OUTPATIENT)
Dept: LAB | Facility: OTHER | Age: 52
End: 2022-10-28
Attending: PHYSICIAN ASSISTANT
Payer: COMMERCIAL

## 2022-10-28 ENCOUNTER — HOSPITAL ENCOUNTER (OUTPATIENT)
Dept: GENERAL RADIOLOGY | Facility: OTHER | Age: 52
Discharge: HOME | End: 2022-10-28
Attending: PHYSICIAN ASSISTANT
Payer: COMMERCIAL

## 2022-10-28 VITALS
HEIGHT: 64 IN | SYSTOLIC BLOOD PRESSURE: 124 MMHG | HEART RATE: 105 BPM | BODY MASS INDEX: 30.25 KG/M2 | WEIGHT: 177.2 LBS | DIASTOLIC BLOOD PRESSURE: 74 MMHG | TEMPERATURE: 97.2 F | OXYGEN SATURATION: 100 % | RESPIRATION RATE: 14 BRPM

## 2022-10-28 DIAGNOSIS — M54.41 CHRONIC RIGHT-SIDED LOW BACK PAIN WITH RIGHT-SIDED SCIATICA: ICD-10-CM

## 2022-10-28 DIAGNOSIS — Z12.4 CERVICAL CANCER SCREENING: ICD-10-CM

## 2022-10-28 DIAGNOSIS — G89.29 CHRONIC RIGHT-SIDED LOW BACK PAIN WITH RIGHT-SIDED SCIATICA: ICD-10-CM

## 2022-10-28 DIAGNOSIS — Z00.00 ROUTINE HISTORY AND PHYSICAL EXAMINATION OF ADULT: Primary | ICD-10-CM

## 2022-10-28 DIAGNOSIS — E78.49 OTHER HYPERLIPIDEMIA: ICD-10-CM

## 2022-10-28 DIAGNOSIS — E11.9 TYPE 2 DIABETES MELLITUS WITHOUT COMPLICATION, WITHOUT LONG-TERM CURRENT USE OF INSULIN (H): ICD-10-CM

## 2022-10-28 DIAGNOSIS — I10 BENIGN ESSENTIAL HYPERTENSION: ICD-10-CM

## 2022-10-28 DIAGNOSIS — E11.9 TYPE 2 DIABETES MELLITUS WITHOUT COMPLICATION, UNSPECIFIED WHETHER LONG TERM INSULIN USE (H): ICD-10-CM

## 2022-10-28 DIAGNOSIS — Z12.31 ENCOUNTER FOR SCREENING MAMMOGRAM FOR BREAST CANCER: ICD-10-CM

## 2022-10-28 DIAGNOSIS — Z12.11 SPECIAL SCREENING FOR MALIGNANT NEOPLASMS, COLON: ICD-10-CM

## 2022-10-28 LAB
ANION GAP SERPL CALCULATED.3IONS-SCNC: 8 MMOL/L (ref 3–14)
BUN SERPL-MCNC: 14 MG/DL (ref 7–25)
CALCIUM SERPL-MCNC: 9.5 MG/DL (ref 8.6–10.3)
CHLORIDE BLD-SCNC: 99 MMOL/L (ref 98–107)
CHOLEST SERPL-MCNC: 161 MG/DL
CO2 SERPL-SCNC: 27 MMOL/L (ref 21–31)
CREAT SERPL-MCNC: 0.94 MG/DL (ref 0.6–1.2)
FASTING STATUS PATIENT QL REPORTED: YES
GFR SERPL CREATININE-BSD FRML MDRD: 73 ML/MIN/1.73M2
GLUCOSE BLD-MCNC: 348 MG/DL (ref 70–105)
HBA1C MFR BLD: 11.2 % (ref 4–6.2)
HDLC SERPL-MCNC: 30 MG/DL (ref 23–92)
HOLD SPECIMEN: NORMAL
LDLC SERPL CALC-MCNC: 97 MG/DL
NONHDLC SERPL-MCNC: 131 MG/DL
POTASSIUM BLD-SCNC: 4.4 MMOL/L (ref 3.5–5.1)
SODIUM SERPL-SCNC: 134 MMOL/L (ref 134–144)
TRIGL SERPL-MCNC: 169 MG/DL

## 2022-10-28 PROCEDURE — 99213 OFFICE O/P EST LOW 20 MIN: CPT | Mod: 25 | Performed by: PHYSICIAN ASSISTANT

## 2022-10-28 PROCEDURE — 82043 UR ALBUMIN QUANTITATIVE: CPT | Mod: ZL | Performed by: PHYSICIAN ASSISTANT

## 2022-10-28 PROCEDURE — 83036 HEMOGLOBIN GLYCOSYLATED A1C: CPT | Mod: ZL | Performed by: PHYSICIAN ASSISTANT

## 2022-10-28 PROCEDURE — 80061 LIPID PANEL: CPT | Mod: ZL | Performed by: PHYSICIAN ASSISTANT

## 2022-10-28 PROCEDURE — G0123 SCREEN CERV/VAG THIN LAYER: HCPCS | Performed by: PHYSICIAN ASSISTANT

## 2022-10-28 PROCEDURE — 36415 COLL VENOUS BLD VENIPUNCTURE: CPT | Mod: ZL | Performed by: PHYSICIAN ASSISTANT

## 2022-10-28 PROCEDURE — 82310 ASSAY OF CALCIUM: CPT | Mod: ZL | Performed by: PHYSICIAN ASSISTANT

## 2022-10-28 PROCEDURE — 87624 HPV HI-RISK TYP POOLED RSLT: CPT | Mod: ZL | Performed by: PHYSICIAN ASSISTANT

## 2022-10-28 PROCEDURE — 99396 PREV VISIT EST AGE 40-64: CPT | Performed by: PHYSICIAN ASSISTANT

## 2022-10-28 PROCEDURE — 72100 X-RAY EXAM L-S SPINE 2/3 VWS: CPT

## 2022-10-28 RX ORDER — LISINOPRIL 10 MG/1
10 TABLET ORAL DAILY
Qty: 90 TABLET | Refills: 3 | Status: SHIPPED | OUTPATIENT
Start: 2022-10-28 | End: 2023-02-16

## 2022-10-28 RX ORDER — ROSUVASTATIN CALCIUM 10 MG/1
10 TABLET, COATED ORAL DAILY
Qty: 90 TABLET | Refills: 3 | Status: SHIPPED | OUTPATIENT
Start: 2022-10-28 | End: 2023-02-16

## 2022-10-28 RX ORDER — LISINOPRIL 10 MG/1
10 TABLET ORAL DAILY
Qty: 90 TABLET | Refills: 3 | Status: CANCELLED | OUTPATIENT
Start: 2022-10-28

## 2022-10-28 RX ORDER — ROSUVASTATIN CALCIUM 10 MG/1
10 TABLET, COATED ORAL DAILY
Qty: 90 TABLET | Refills: 3 | Status: CANCELLED | OUTPATIENT
Start: 2022-10-28

## 2022-10-28 RX ORDER — ROSUVASTATIN CALCIUM 10 MG/1
10 TABLET, COATED ORAL DAILY
Qty: 30 TABLET | Refills: 0 | Status: SHIPPED | OUTPATIENT
Start: 2022-10-28 | End: 2022-10-28

## 2022-10-28 ASSESSMENT — ENCOUNTER SYMPTOMS
BREAST MASS: 0
CHILLS: 0
DYSURIA: 0
DIZZINESS: 0
COUGH: 0
PALPITATIONS: 0
HEMATURIA: 0
ARTHRALGIAS: 1
PARESTHESIAS: 0
MYALGIAS: 1
SORE THROAT: 0
CONSTIPATION: 1
FREQUENCY: 1
DIARRHEA: 0
HEARTBURN: 0
SHORTNESS OF BREATH: 0
NERVOUS/ANXIOUS: 0
EYE PAIN: 0
NAUSEA: 0
HEADACHES: 0
HEMATOCHEZIA: 0
ABDOMINAL PAIN: 1
JOINT SWELLING: 0
WEAKNESS: 0
FEVER: 0

## 2022-10-28 ASSESSMENT — PAIN SCALES - GENERAL: PAINLEVEL: NO PAIN (1)

## 2022-10-28 NOTE — NURSING NOTE
"Patient presents to clinic for annual physical.  Chief Complaint   Patient presents with     Physical       Medications reviewed:  No discrepancies identified.      Initial /74   Pulse 105   Temp 97.2  F (36.2  C)   Resp 14   Ht 1.632 m (5' 4.25\")   Wt 80.4 kg (177 lb 3.2 oz)   LMP  (LMP Unknown)   SpO2 100%   Breastfeeding No   BMI 30.18 kg/m   Estimated body mass index is 30.18 kg/m  as calculated from the following:    Height as of this encounter: 1.632 m (5' 4.25\").    Weight as of this encounter: 80.4 kg (177 lb 3.2 oz).  BP completed using cuff size:  nadira Carmona LPN ....................  10/28/2022   7:38 AM      "

## 2022-10-30 LAB
CREAT UR-MCNC: 154 MG/DL
MICROALBUMIN UR-MCNC: <12 MG/L
MICROALBUMIN/CREAT UR: NORMAL MG/G{CREAT}

## 2022-11-01 RX ORDER — LISINOPRIL 10 MG/1
TABLET ORAL
Qty: 30 TABLET | Refills: 0 | OUTPATIENT
Start: 2022-11-01

## 2022-11-01 NOTE — TELEPHONE ENCOUNTER
lisinopril (ZESTRIL) 10 MG tablet 90 tablet 3 10/28/2022  No   Sig - Route: Take 1 tablet (10 mg) by mouth daily -     To [facility]   Already refilled [facility]    Joanie Barkley RN on 11/1/2022 at 8:34 AM

## 2022-11-02 LAB
BKR LAB AP GYN ADEQUACY: NORMAL
BKR LAB AP GYN INTERPRETATION: NORMAL
BKR LAB AP HPV REFLEX: NORMAL
BKR LAB AP PREVIOUS ABNORMAL: NORMAL
PATH REPORT.COMMENTS IMP SPEC: NORMAL
PATH REPORT.COMMENTS IMP SPEC: NORMAL
PATH REPORT.RELEVANT HX SPEC: NORMAL

## 2022-11-08 LAB
HUMAN PAPILLOMA VIRUS 16 DNA: NEGATIVE
HUMAN PAPILLOMA VIRUS 18 DNA: NEGATIVE
HUMAN PAPILLOMA VIRUS FINAL DIAGNOSIS: NORMAL
HUMAN PAPILLOMA VIRUS OTHER HR: NEGATIVE

## 2022-11-11 LAB — NONINV COLON CA DNA+OCC BLD SCRN STL QL: NEGATIVE

## 2022-11-22 ENCOUNTER — HOSPITAL ENCOUNTER (OUTPATIENT)
Dept: MAMMOGRAPHY | Facility: OTHER | Age: 52
Discharge: HOME | End: 2022-11-22
Attending: PHYSICIAN ASSISTANT | Admitting: PHYSICIAN ASSISTANT
Payer: COMMERCIAL

## 2022-11-22 DIAGNOSIS — Z12.31 ENCOUNTER FOR SCREENING MAMMOGRAM FOR BREAST CANCER: ICD-10-CM

## 2022-11-22 PROCEDURE — 77067 SCR MAMMO BI INCL CAD: CPT

## 2023-02-15 NOTE — PROGRESS NOTES
Assessment & Plan     1. Type 2 diabetes mellitus without complication, unspecified whether long term insulin use (H)  2. Benign essential hypertension  Much improved with consistent use of metformin.  A1c down to 8.0.  Recommend increasing dose to 1000 mg twice daily going forward.  Continue with rosuvastatin and lisinopril, 90-day supply of each with refills sent to pharmacy again..  Follow-up for recheck in 3 months.  - Hemoglobin A1c; Future  - Hemoglobin A1c  - lisinopril (ZESTRIL) 10 MG tablet; Take 1 tablet (10 mg) by mouth daily  Dispense: 90 tablet; Refill: 3    3. Edema of right lower extremity  Differential includes musculoskeletal cause such as strain, underlying hip or knee injury, underlying osteoarthritis, PVD, PAD, chronic venous insufficiency, DVT, lymphedema, renal insufficiency, CHF, dependent edema, etc.  Vitals stable, mild swelling noted on exam throughout right extremity, no pitting edema.  Remaining of exam unremarkable.  Renal function stable when checked this past fall.  Will pursue ultrasound to assess vascular system. Consider PT for management based on results. Encourage to focus on healthy lifestyle, compression stockings, low-sodium diet, good hydration, physical activity.  - US Lower Extremity Venous Duplex Left; Future      Return in about 3 months (around 5/16/2023), or if symptoms worsen or fail to improve.    Imelda Sexton PA-C  [facility]    Subjective   Julia is a 53 year old, presenting for the following health issues:  Diabetes      History of Present Illness       Diabetes:   She presents for follow up of diabetes.  She is not checking blood glucose. She has no concerns regarding her diabetes at this time.  She is not experiencing numbness or burning in feet, excessive thirst, blurry vision, weight changes or redness, sores or blisters on feet. The patient has not had a diabetic eye exam in the last 12 months.         She eats 0-1 servings of  fruits and vegetables daily.She consumes 0 sweetened beverage(s) daily.She exercises with enough effort to increase her heart rate 10 to 19 minutes per day.  She exercises with enough effort to increase her heart rate 3 or less days per week.   She is taking medications regularly.     Here for evaluation of multiple concerns.     Diabetes/HTN/Hyperlipidemia  Longstanding history of Type II DM for which she continues on metformin. Was prescribed with intention to increase metformin to 1000 mg BID, however patient reports she has been receiving inaccurate amounts of pills monthly from the pharmacy, is only able to take 500 in the AM and 1000 mg PM. A1c last completed in October and was 11.2.  Continues on lisinopril for blood pressure, well controlled.  Continues on rosuvastatin for hyperlipidemia, levels stable in October.    Leg:  Patient reports a several year history of right lower extremity swelling.  Seems to be worse after being on her feet all day.  Was discussed at her physical in October where she was associated with some right-sided low back pain.  Lumbar x-ray at that time showed mild degenerative changes.  Swelling goes up through knee and thigh.  Does feel like there is some muscle tension throughout her right lower extremity as well.  No associated overlying skin changes.  No known injury to her hip, knee, foot or ankle.  Not managing with compression stockings, reports she is not consuming a healthy diet.  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      PAST MEDICAL HISTORY:   Past Medical History:   Diagnosis Date     Personal history of other medical treatment (CODE)     No Comments Provided       PAST SURGICAL HISTORY:   Past Surgical History:   Procedure Laterality Date     DENTAL SURGERY      No Comments Provided     TONSILLECTOMY, ADENOIDECTOMY, COMBINED      No Comments Provided     TYMPANOSTOMY, LOCAL/TOPICAL ANESTHESIA      No Comments Provided       FAMILY  "HISTORY: History reviewed. No pertinent family history.    SOCIAL HISTORY:   Social History     Tobacco Use     Smoking status: Former     Packs/day: 1.00     Types: Cigarettes     Smokeless tobacco: Never   Substance Use Topics     Alcohol use: No      No Known Allergies  Current Outpatient Medications   Medication     lisinopril (ZESTRIL) 10 MG tablet     metFORMIN (GLUCOPHAGE) 1000 MG tablet     rosuvastatin (CRESTOR) 10 MG tablet     No current facility-administered medications for this visit.         Review of Systems   Per HPI        Objective    /68   Pulse 114   Temp (!) 95.8  F (35.4  C)   Resp 14   Ht 1.632 m (5' 4.25\")   Wt 82.3 kg (181 lb 6.4 oz)   LMP  (LMP Unknown)   SpO2 98%   BMI 30.90 kg/m    Body mass index is 30.9 kg/m .  Physical Exam   General: Pleasant, in no apparent distress.  Cardiovascular: Regular rate and rhythm with S1 equal to S2. No murmurs, friction rubs, or gallops.   Respiratory: Lungs are resonant and clear to auscultation bilaterally. No wheezes, crackles, or rhonchi.  Musculoskeletal: Back is straight, no tenderness to palpation of paraspinal and paravertebral muscles.  Mild visible swelling throughout right lower extremity.  No areas of palpable tenderness.  Limited internal and external rotation at hip level, range of motion improved with passive assistance.  Nonantalgic gait in clinic.  No pitting edema.  Neurologic Exam: Normal gross motor, tone coordination and no visible tremor.  Skin: No jaundice, pallor, rashes, or lesions.  Psych: Appropriate mood and affect.    Results for orders placed or performed in visit on 02/16/23   Hemoglobin A1c     Status: Abnormal   Result Value Ref Range    Hemoglobin A1C 8.0 (H) 4.0 - 6.2 %         "

## 2023-02-16 ENCOUNTER — OFFICE VISIT (OUTPATIENT)
Dept: FAMILY MEDICINE | Facility: OTHER | Age: 53
End: 2023-02-16
Attending: PHYSICIAN ASSISTANT
Payer: COMMERCIAL

## 2023-02-16 VITALS
DIASTOLIC BLOOD PRESSURE: 68 MMHG | HEART RATE: 114 BPM | SYSTOLIC BLOOD PRESSURE: 116 MMHG | OXYGEN SATURATION: 98 % | WEIGHT: 181.4 LBS | HEIGHT: 64 IN | TEMPERATURE: 95.8 F | BODY MASS INDEX: 30.97 KG/M2 | RESPIRATION RATE: 14 BRPM

## 2023-02-16 DIAGNOSIS — E11.9 TYPE 2 DIABETES MELLITUS WITHOUT COMPLICATION, UNSPECIFIED WHETHER LONG TERM INSULIN USE (H): Primary | ICD-10-CM

## 2023-02-16 DIAGNOSIS — I10 BENIGN ESSENTIAL HYPERTENSION: ICD-10-CM

## 2023-02-16 DIAGNOSIS — R60.0 EDEMA OF RIGHT LOWER EXTREMITY: ICD-10-CM

## 2023-02-16 LAB — HBA1C MFR BLD: 8 % (ref 4–6.2)

## 2023-02-16 PROCEDURE — 99214 OFFICE O/P EST MOD 30 MIN: CPT | Performed by: PHYSICIAN ASSISTANT

## 2023-02-16 PROCEDURE — 36415 COLL VENOUS BLD VENIPUNCTURE: CPT | Mod: ZL | Performed by: PHYSICIAN ASSISTANT

## 2023-02-16 PROCEDURE — 83036 HEMOGLOBIN GLYCOSYLATED A1C: CPT | Mod: ZL | Performed by: PHYSICIAN ASSISTANT

## 2023-02-16 RX ORDER — LISINOPRIL 10 MG/1
10 TABLET ORAL DAILY
Qty: 90 TABLET | Refills: 3 | Status: SHIPPED | OUTPATIENT
Start: 2023-02-16 | End: 2024-01-18

## 2023-02-16 RX ORDER — ROSUVASTATIN CALCIUM 10 MG/1
10 TABLET, COATED ORAL DAILY
Qty: 90 TABLET | Refills: 3 | Status: SHIPPED | OUTPATIENT
Start: 2023-02-16 | End: 2024-01-18

## 2023-02-16 ASSESSMENT — PAIN SCALES - GENERAL: PAINLEVEL: NO PAIN (0)

## 2023-02-20 ENCOUNTER — HOSPITAL ENCOUNTER (OUTPATIENT)
Dept: ULTRASOUND IMAGING | Facility: OTHER | Age: 53
Discharge: HOME | End: 2023-02-20
Attending: PHYSICIAN ASSISTANT | Admitting: PHYSICIAN ASSISTANT
Payer: COMMERCIAL

## 2023-02-20 DIAGNOSIS — R60.0 EDEMA OF RIGHT LOWER EXTREMITY: ICD-10-CM

## 2023-02-20 PROCEDURE — 93971 EXTREMITY STUDY: CPT | Mod: RT

## 2023-05-23 ENCOUNTER — OFFICE VISIT (OUTPATIENT)
Dept: FAMILY MEDICINE | Facility: OTHER | Age: 53
End: 2023-05-23
Attending: PHYSICIAN ASSISTANT
Payer: COMMERCIAL

## 2023-05-23 ENCOUNTER — HOSPITAL ENCOUNTER (OUTPATIENT)
Dept: GENERAL RADIOLOGY | Facility: OTHER | Age: 53
Discharge: HOME | End: 2023-05-23
Attending: PHYSICIAN ASSISTANT
Payer: COMMERCIAL

## 2023-05-23 VITALS
WEIGHT: 187 LBS | SYSTOLIC BLOOD PRESSURE: 124 MMHG | BODY MASS INDEX: 31.92 KG/M2 | HEART RATE: 102 BPM | DIASTOLIC BLOOD PRESSURE: 76 MMHG | TEMPERATURE: 98 F | HEIGHT: 64 IN | OXYGEN SATURATION: 98 % | RESPIRATION RATE: 14 BRPM

## 2023-05-23 DIAGNOSIS — R60.0 EDEMA OF RIGHT LOWER EXTREMITY: ICD-10-CM

## 2023-05-23 DIAGNOSIS — M25.551 CHRONIC RIGHT HIP PAIN: Primary | ICD-10-CM

## 2023-05-23 DIAGNOSIS — G89.29 CHRONIC PAIN OF RIGHT KNEE: ICD-10-CM

## 2023-05-23 DIAGNOSIS — M25.561 CHRONIC PAIN OF RIGHT KNEE: ICD-10-CM

## 2023-05-23 DIAGNOSIS — M25.551 CHRONIC RIGHT HIP PAIN: ICD-10-CM

## 2023-05-23 DIAGNOSIS — E11.9 TYPE 2 DIABETES MELLITUS WITHOUT COMPLICATION, UNSPECIFIED WHETHER LONG TERM INSULIN USE (H): ICD-10-CM

## 2023-05-23 DIAGNOSIS — G89.29 CHRONIC RIGHT HIP PAIN: ICD-10-CM

## 2023-05-23 DIAGNOSIS — G89.29 CHRONIC RIGHT HIP PAIN: Primary | ICD-10-CM

## 2023-05-23 LAB — HBA1C MFR BLD: 6.2 % (ref 4–6.2)

## 2023-05-23 PROCEDURE — 36415 COLL VENOUS BLD VENIPUNCTURE: CPT | Mod: ZL | Performed by: PHYSICIAN ASSISTANT

## 2023-05-23 PROCEDURE — 73502 X-RAY EXAM HIP UNI 2-3 VIEWS: CPT

## 2023-05-23 PROCEDURE — 73562 X-RAY EXAM OF KNEE 3: CPT | Mod: RT

## 2023-05-23 PROCEDURE — 99214 OFFICE O/P EST MOD 30 MIN: CPT | Performed by: PHYSICIAN ASSISTANT

## 2023-05-23 PROCEDURE — 83036 HEMOGLOBIN GLYCOSYLATED A1C: CPT | Mod: ZL | Performed by: PHYSICIAN ASSISTANT

## 2023-05-23 ASSESSMENT — PAIN SCALES - GENERAL: PAINLEVEL: MILD PAIN (3)

## 2023-05-23 NOTE — PROGRESS NOTES
Assessment & Plan     1. Chronic right hip pain  2. Chronic pain of right knee  3. Edema of right lower extremity  X-ray of hip unremarkable, mild degenerative changes to medial right knee on x-ray.  Differential includes muscle strain, underlying soft tissue injury to hip or knee joint, PVD, PAD, chronic venous insufficiency, DVT, lymphedema, CHF, dependent edema, etc.  Reviewed previous lab work, negative ultrasound.  Discussed options including lifestyle management with healthy lifestyle, compression stockings, low-sodium diet, good hydration, adequate physical activity.  Physical therapy referral also placed.  Okay to continue to follow with chiropractor.  If minimal improvement consider specialty follow-up.  - XR Hip Right 2-3 Views; Future  - XR Knee Right 3 Views; Future    4. Type 2 diabetes mellitus without complication, unspecified whether long term insulin use (H)  Well controlled with A1c down to 6.2.  No change in medications.  Continue with healthy lifestyle.  Follow-up for recheck in 3 months.  - Hemoglobin A1c; Future  - Hemoglobin A1c      Return if symptoms worsen or fail to improve.    Imelda Sexton PA-C  [facility]    Subjective   Julia is a 53 year old, presenting for the following health issues:  Follow Up      History of Present Illness       Reason for visit:  Leg swelling and abdomnal discomfort    She eats 0-1 servings of fruits and vegetables daily.She consumes 1 sweetened beverage(s) daily.She exercises with enough effort to increase her heart rate 9 or less minutes per day.  She exercises with enough effort to increase her heart rate 3 or less days per week.   She is taking medications regularly.     Here for follow-up on chronic right leg symptoms.  Has struggled with chronic right hip, knee pain as well as swelling throughout her entire right lower extremity for years.  She has been evaluated for this on and off with most recent lab work stable, negative  "venous ultrasound.  She has been following with a chiropractor which seems to provide some short-term relief.  Most recently her chiropractor pushed on her anterior hip/groin line which caused some pain.  Has not previously had imaging of her hip or knee.  Struggles with stiffness, pain, limited range of motion worse at the end of the day.  No known injury.  Is also due for A1c.  Continues on metformin 1000 mg twice daily for type 2 diabetes.  A1c last checked in February and was down to 8.0 from 11.2 previously.  Has been working on healthy lifestyle.    PAST MEDICAL HISTORY:   Past Medical History:   Diagnosis Date     Personal history of other medical treatment (CODE)     No Comments Provided       PAST SURGICAL HISTORY:   Past Surgical History:   Procedure Laterality Date     DENTAL SURGERY      No Comments Provided     TONSILLECTOMY, ADENOIDECTOMY, COMBINED      No Comments Provided     TYMPANOSTOMY, LOCAL/TOPICAL ANESTHESIA      No Comments Provided       FAMILY HISTORY: History reviewed. No pertinent family history.    SOCIAL HISTORY:   Social History     Tobacco Use     Smoking status: Former     Packs/day: 1.00     Types: Cigarettes     Smokeless tobacco: Never   Vaping Use     Vaping status: Never Used   Substance Use Topics     Alcohol use: No      No Known Allergies  Current Outpatient Medications   Medication     lisinopril (ZESTRIL) 10 MG tablet     metFORMIN (GLUCOPHAGE) 1000 MG tablet     rosuvastatin (CRESTOR) 10 MG tablet     No current facility-administered medications for this visit.         Review of Systems   Per HPI        Objective    /76   Pulse 102   Temp 98  F (36.7  C)   Resp 14   Ht 1.632 m (5' 4.25\")   Wt 84.8 kg (187 lb)   LMP  (LMP Unknown)   SpO2 98%   BMI 31.85 kg/m    Body mass index is 31.85 kg/m .  Physical Exam   General: Pleasant, in no apparent distress.  Musculoskeletal: Mild tenderness palpation over anterior joint line of right hip, no tenderness palpation " throughout lateral hip, SI joints, lower back, knee.  Mildly limited internal/external rotation of right hip compared to left.  Full flexion and extension bilaterally.  No tenderness palpation throughout knee.  Nonantalgic gait in clinic.  Neurologic Exam: Normal gross motor, tone coordination and no visible tremor.  Skin: No jaundice, pallor, rashes, or lesions.  Psych: Appropriate mood and affect.    Results for orders placed or performed during the hospital encounter of 05/23/23   XR Hip Right 2-3 Views     Status: None    Narrative    PROCEDURE:  XR HIP RIGHT 2-3 VIEWS    HISTORY: Chronic right hip pain; Chronic right hip pain; Edema of  right lower extremity    COMPARISON:  None.    TECHNIQUE:  XR HIP RIGHT 2 VIEWS    FINDINGS:   No fracture or dislocation is identified. The joint spaces are  preserved.     No foreign body is seen.     Soft tissues are within normal limits.       Impression    IMPRESSION:   No acute osseous abnormality.    FRANKLIN PRINGLE MD         SYSTEM ID:  [id number]   Results for orders placed or performed during the hospital encounter of 05/23/23   XR Knee Right 3 Views     Status: None    Narrative    PROCEDURE:  XR KNEE RIGHT 3 VIEWS    HISTORY: Chronic pain of right knee; Chronic pain of right knee; Edema  of right lower extremity    COMPARISON:  None.    TECHNIQUE:  XR KNEE RIGHT 3 VIEWS    FINDINGS:    No acute osseous abnormality. Joints are appropriately aligned. Mild  medial tibiofemoral compartment joint space narrowing.     No patellar tilt or lateral subluxation. No joint effusion. Soft  tissue is unremarkable.      Impression    IMPRESSION:   No acute osseous abnormality.    FRANKLIN PRINGLE MD         SYSTEM ID:  [id number]   Results for orders placed or performed in visit on 05/23/23   Hemoglobin A1c     Status: Normal   Result Value Ref Range    Hemoglobin A1C 6.2 4.0 - 6.2 %

## 2023-05-23 NOTE — NURSING NOTE
Patient presents to clinic for follow up on right lower extremity.  Ana Carmona LPN ....................  5/23/2023   2:44 PM

## 2023-06-22 ENCOUNTER — OFFICE VISIT (OUTPATIENT)
Dept: FAMILY MEDICINE | Facility: OTHER | Age: 53
End: 2023-06-22
Attending: FAMILY MEDICINE
Payer: COMMERCIAL

## 2023-06-22 ENCOUNTER — LAB (OUTPATIENT)
Dept: LAB | Facility: OTHER | Age: 53
End: 2023-06-22
Attending: FAMILY MEDICINE
Payer: COMMERCIAL

## 2023-06-22 VITALS
RESPIRATION RATE: 16 BRPM | SYSTOLIC BLOOD PRESSURE: 126 MMHG | DIASTOLIC BLOOD PRESSURE: 78 MMHG | WEIGHT: 184.4 LBS | TEMPERATURE: 97.4 F | BODY MASS INDEX: 31.41 KG/M2 | HEART RATE: 104 BPM | OXYGEN SATURATION: 98 %

## 2023-06-22 DIAGNOSIS — R10.84 CHRONIC GENERALIZED ABDOMINAL PAIN: ICD-10-CM

## 2023-06-22 DIAGNOSIS — R10.84 CHRONIC GENERALIZED ABDOMINAL PAIN: Primary | ICD-10-CM

## 2023-06-22 DIAGNOSIS — R60.0 LEG EDEMA, RIGHT: ICD-10-CM

## 2023-06-22 DIAGNOSIS — G89.29 CHRONIC GENERALIZED ABDOMINAL PAIN: Primary | ICD-10-CM

## 2023-06-22 DIAGNOSIS — G89.29 CHRONIC GENERALIZED ABDOMINAL PAIN: ICD-10-CM

## 2023-06-22 LAB
ALBUMIN SERPL BCG-MCNC: 4.6 G/DL (ref 3.5–5.2)
ALBUMIN UR-MCNC: NEGATIVE MG/DL
ALP SERPL-CCNC: 91 U/L (ref 35–104)
ALT SERPL W P-5'-P-CCNC: 27 U/L (ref 0–50)
ANION GAP SERPL CALCULATED.3IONS-SCNC: 12 MMOL/L (ref 7–15)
APPEARANCE UR: CLEAR
AST SERPL W P-5'-P-CCNC: 17 U/L (ref 0–45)
BILIRUB SERPL-MCNC: 0.4 MG/DL
BILIRUB UR QL STRIP: NEGATIVE
BUN SERPL-MCNC: 13.5 MG/DL (ref 6–20)
CALCIUM SERPL-MCNC: 9.7 MG/DL (ref 8.6–10)
CHLORIDE SERPL-SCNC: 103 MMOL/L (ref 98–107)
COLOR UR AUTO: NORMAL
CREAT SERPL-MCNC: 0.75 MG/DL (ref 0.51–0.95)
DEPRECATED HCO3 PLAS-SCNC: 24 MMOL/L (ref 22–29)
ERYTHROCYTE [DISTWIDTH] IN BLOOD BY AUTOMATED COUNT: 13.3 % (ref 10–15)
GFR SERPL CREATININE-BSD FRML MDRD: >90 ML/MIN/1.73M2
GLUCOSE SERPL-MCNC: 173 MG/DL (ref 70–99)
GLUCOSE UR STRIP-MCNC: NEGATIVE MG/DL
HCT VFR BLD AUTO: 39.3 % (ref 35–47)
HGB BLD-MCNC: 13.6 G/DL (ref 11.7–15.7)
HGB UR QL STRIP: NEGATIVE
KETONES UR STRIP-MCNC: NEGATIVE MG/DL
LEUKOCYTE ESTERASE UR QL STRIP: NEGATIVE
LIPASE SERPL-CCNC: 36 U/L (ref 13–60)
MCH RBC QN AUTO: 29.3 PG (ref 26.5–33)
MCHC RBC AUTO-ENTMCNC: 34.6 G/DL (ref 31.5–36.5)
MCV RBC AUTO: 85 FL (ref 78–100)
NITRATE UR QL: NEGATIVE
PH UR STRIP: 5.5 [PH] (ref 5–9)
PLATELET # BLD AUTO: 185 10E3/UL (ref 150–450)
POTASSIUM SERPL-SCNC: 4.4 MMOL/L (ref 3.4–5.3)
PROT SERPL-MCNC: 7.1 G/DL (ref 6.4–8.3)
RBC # BLD AUTO: 4.64 10E6/UL (ref 3.8–5.2)
SODIUM SERPL-SCNC: 139 MMOL/L (ref 136–145)
SP GR UR STRIP: 1.02 (ref 1–1.03)
UROBILINOGEN UR STRIP-MCNC: 2 MG/DL
WBC # BLD AUTO: 12.1 10E3/UL (ref 4–11)

## 2023-06-22 PROCEDURE — 81003 URINALYSIS AUTO W/O SCOPE: CPT | Mod: ZL

## 2023-06-22 PROCEDURE — 83690 ASSAY OF LIPASE: CPT | Mod: ZL | Performed by: FAMILY MEDICINE

## 2023-06-22 PROCEDURE — 80053 COMPREHEN METABOLIC PANEL: CPT | Mod: ZL | Performed by: FAMILY MEDICINE

## 2023-06-22 PROCEDURE — 99214 OFFICE O/P EST MOD 30 MIN: CPT | Performed by: FAMILY MEDICINE

## 2023-06-22 PROCEDURE — 36415 COLL VENOUS BLD VENIPUNCTURE: CPT | Mod: ZL | Performed by: FAMILY MEDICINE

## 2023-06-22 PROCEDURE — 85027 COMPLETE CBC AUTOMATED: CPT | Mod: ZL | Performed by: FAMILY MEDICINE

## 2023-06-22 ASSESSMENT — PAIN SCALES - GENERAL: PAINLEVEL: NO PAIN (0)

## 2023-06-22 NOTE — NURSING NOTE
"Chief Complaint   Patient presents with     Abdominal Pain     Right side       Initial /78   Pulse 104   Temp 97.4  F (36.3  C) (Tympanic)   Resp 16   Wt 83.6 kg (184 lb 6.4 oz)   LMP  (LMP Unknown)   SpO2 98%   BMI 31.41 kg/m   Estimated body mass index is 31.41 kg/m  as calculated from the following:    Height as of 5/23/23: 1.632 m (5' 4.25\").    Weight as of this encounter: 83.6 kg (184 lb 6.4 oz).  Medication Reconciliation: complete    FOOD SECURITY SCREENING QUESTIONS  Hunger Vital Signs:  Within the past 12 months we worried whether our food would run out before we got money to buy more. Never  Within the past 12 months the food we bought just didn't last and we didn't have money to get more. Never  Sabrina South LPN 6/22/2023 8:50 AM      "

## 2023-06-22 NOTE — PROGRESS NOTES
"  Assessment & Plan     (R10.84,  G89.29) Chronic generalized abdominal pain  (primary encounter diagnosis)  Comment: clearly has constipatoin and that might be the root cause of this. Has started to address this, I asked her to do the Mirilax twice daily now. Gal bladder disease could also cause some of these symptoms. Perhaps chronic kidney stones too. With the unilateral lower extremity edema, a venous or lymphatic obstruction is possible. Will proceed therefore with the CT scan next. A1c done 4 weeks ago was normal at 6.2, so diabetes unlikely but certainly in the pre diabetes category.   Plan: UA reflex to Microscopic, Comprehensive         Metabolic Panel, CBC W PLT No Diff, Lipase             (R60.0) Leg edema, right  Comment: none on exam see above.  Plan:               BMI:   Estimated body mass index is 31.41 kg/m  as calculated from the following:    Height as of 5/23/23: 1.632 m (5' 4.25\").    Weight as of this encounter: 83.6 kg (184 lb 6.4 oz).           No follow-ups on file.    Shorty Fajardo MD  [facility]    Subjective   Julia is a 53 year old, presenting for the following health issues:  Abdominal Pain (Right side)        6/22/2023     8:47 AM   Additional Questions   Roomed by KALLI Bennett   Accompanied by Self         6/22/2023     8:47 AM   Patient Reported Additional Medications   Patient reports taking the following new medications N/A     History of Present Illness       Reason for visit:  Stomach and right side issue    She eats 0-1 servings of fruits and vegetables daily.She consumes 1 sweetened beverage(s) daily.She exercises with enough effort to increase her heart rate 9 or less minutes per day.  She exercises with enough effort to increase her heart rate 3 or less days per week.   She is taking medications regularly.     Wants to establish with me/ has had diabetes for a few years, on metformin for 2 years. No insurance for a while and was getting care at [facility]. Has had years of bilateral upper abdomen pain that is now localizing into the ruq. Will radiate up into right upper flank area. Can sometimes also move into the right lower quadrant/groin area. No patterns with foods or eating. Will feel it when she gets up in the morning. As had some right lower extremity edema, with a negative venous ultrasound recently. Has a hard time simply walking in a straight line due to the right leg issues.  Edema on the right side for perhaps 1 year or more.     Has a bowel movement perhaps weekly. Started Mirilax 3 days ago. Had a cologaurd within a year, was normal. LMP was about 2 years ago. No blood in stool. No EtOH at all.     Current Outpatient Medications   Medication     lisinopril (ZESTRIL) 10 MG tablet     metFORMIN (GLUCOPHAGE) 1000 MG tablet     rosuvastatin (CRESTOR) 10 MG tablet     No current facility-administered medications for this visit.                 Review of Systems         Objective    /78   Pulse 104   Temp 97.4  F (36.3  C) (Tympanic)   Resp 16   Wt 83.6 kg (184 lb 6.4 oz)   LMP  (LMP Unknown)   SpO2 98%   BMI 31.41 kg/m    Body mass index is 31.41 kg/m .  Physical Exam  Constitutional:       Appearance: Normal appearance.   Cardiovascular:      Rate and Rhythm: Normal rate.   Pulmonary:      Effort: Pulmonary effort is normal. No respiratory distress.      Breath sounds: No stridor.   Abdominal:      General: Abdomen is flat. There is no distension.      Palpations: There is no mass.      Tenderness: There is abdominal tenderness. There is no right CVA tenderness, left CVA tenderness or rebound.      Hernia: No hernia is present.      Comments: Mild ruq and right lower quadrant abdomen pain.    Neurological:      General: No focal deficit present.      Mental Status: She is alert and oriented to person, place, and time.   Psychiatric:         Mood and Affect: Mood normal.         Thought Content: Thought content normal.            Results  for orders placed or performed in visit on 06/22/23   UA reflex to Microscopic     Status: Normal   Result Value Ref Range    Color Urine Light Yellow Colorless, Straw, Light Yellow, Yellow    Appearance Urine Clear Clear    Glucose Urine Negative Negative mg/dL    Bilirubin Urine Negative Negative    Ketones Urine Negative Negative mg/dL    Specific Gravity Urine 1.016 1.000 - 1.030    Blood Urine Negative Negative    pH Urine 5.5 5.0 - 9.0    Protein Albumin Urine Negative Negative mg/dL    Urobilinogen Urine 2.0 Normal, 2.0 mg/dL    Nitrite Urine Negative Negative    Leukocyte Esterase Urine Negative Negative    Narrative    Microscopic not indicated   Results for orders placed or performed in visit on 06/22/23   Comprehensive Metabolic Panel     Status: Abnormal   Result Value Ref Range    Sodium 139 136 - 145 mmol/L    Potassium 4.4 3.4 - 5.3 mmol/L    Chloride 103 98 - 107 mmol/L    Carbon Dioxide (CO2) 24 22 - 29 mmol/L    Anion Gap 12 7 - 15 mmol/L    Urea Nitrogen 13.5 6.0 - 20.0 mg/dL    Creatinine 0.75 0.51 - 0.95 mg/dL    Calcium 9.7 8.6 - 10.0 mg/dL    Glucose 173 (H) 70 - 99 mg/dL    Alkaline Phosphatase 91 35 - 104 U/L    AST 17 0 - 45 U/L    ALT 27 0 - 50 U/L    Protein Total 7.1 6.4 - 8.3 g/dL    Albumin 4.6 3.5 - 5.2 g/dL    Bilirubin Total 0.4 <=1.2 mg/dL    GFR Estimate >90 >60 mL/min/1.73m2   CBC W PLT No Diff     Status: Abnormal   Result Value Ref Range    WBC Count 12.1 (H) 4.0 - 11.0 10e3/uL    RBC Count 4.64 3.80 - 5.20 10e6/uL    Hemoglobin 13.6 11.7 - 15.7 g/dL    Hematocrit 39.3 35.0 - 47.0 %    MCV 85 78 - 100 fL    MCH 29.3 26.5 - 33.0 pg    MCHC 34.6 31.5 - 36.5 g/dL    RDW 13.3 10.0 - 15.0 %    Platelet Count 185 150 - 450 10e3/uL   Lipase     Status: Normal   Result Value Ref Range    Lipase 36 13 - 60 U/L

## 2023-07-12 ENCOUNTER — THERAPY VISIT (OUTPATIENT)
Dept: PHYSICAL THERAPY | Facility: OTHER | Age: 53
End: 2023-07-12
Attending: PHYSICIAN ASSISTANT
Payer: COMMERCIAL

## 2023-07-12 DIAGNOSIS — M25.551 CHRONIC RIGHT HIP PAIN: ICD-10-CM

## 2023-07-12 DIAGNOSIS — G89.29 CHRONIC RIGHT HIP PAIN: ICD-10-CM

## 2023-07-12 DIAGNOSIS — G89.29 CHRONIC PAIN OF RIGHT KNEE: ICD-10-CM

## 2023-07-12 DIAGNOSIS — M25.561 CHRONIC PAIN OF RIGHT KNEE: ICD-10-CM

## 2023-07-12 PROCEDURE — 97035 APP MDLTY 1+ULTRASOUND EA 15: CPT | Mod: GP

## 2023-07-12 PROCEDURE — 97110 THERAPEUTIC EXERCISES: CPT | Mod: GP

## 2023-07-12 PROCEDURE — 97162 PT EVAL MOD COMPLEX 30 MIN: CPT | Mod: GP

## 2023-07-12 NOTE — PROGRESS NOTES
PHYSICAL THERAPY EVALUATION  Type of Visit: Evaluation    See electronic medical record for Abuse and Falls Screening details.    Subjective      Presenting condition or subjective complaint: Pt is a 53 year old female referred to skilled PT services following continued R LE swelling with pt noting it has been going on for years. pt reports knee was swollen first then the hip and now the ankle is getting more swollen. Pt reports the last 2 years her whole abdomin and R side of her back has been very stiff and sore. pt reports her swelling is always the same and does not change during the day but pt has not been given a compression garment. pt reports lab work did not come up with a reason for the swelling. pt does not report an issue with weakness but does have more stiffness. pt has been to the chiro for adjustments with no change in swelling or stiffness.  Date of onset: 05/23/23 (has been going on for years)    Relevant medical history: Diabetes; High blood pressure; Arthritis; Menopause     Prior diagnostic imaging/testing results: X-ray mild degenerative changes in her hip and knee   Prior therapy history for the same diagnosis, illness or injury: No      Prior Level of Function   Transfers: Independent  Ambulation: Independent  ADL: Independent  IADL: Driving, Finances, Housekeeping, Laundry, Meal preparation, Medication management, Work    Living Environment  Social support: With family members   Type of home: Apartment/condo   Stairs to enter the home: No   Is there a railing: No   Ramp:     Stairs inside the home: No       Help at home: None  Equipment owned:       Employment: Yes manager of apartments, 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  Hobbies/Interests:      Patient goals for therapy: Pt just pushes through it.    Pain assessment: See objective evaluation for additional pain details     Objective   KNEE EVALUATION  PAIN: Pain Level at Rest: 5/10  Pain Level with Use: 5/10  Pain Location: hip and  knee  Pain is Exacerbated By: swelling and stiffness that is constant all day, no change with any activity   Pain is Relieved By: none  INTEGUMENTARY (edema, incisions): L LE: 51 cm at thigh, 42 cm above knee, 40 cm below knee. R LE: 56 cm at thigh, 46 cm above knee, 41 cm below knee  POSTURE: WFL  GAIT:  Weightbearing Status: WBAT  Assistive Device(s): None  Gait Deviations: increase MONIE, lack of R hip flexion and knee flexion  ROM: L LE WFL, R knee ext lack of 15 deg, R knee flexion 120 deg, R hip flexion 100 deg, R hip ABD 25 deg  STRENGTH: R and L LE equal and grossly 4+/5   FLEXIBILITY: decreased ease of motion and flexibility on the R vs L at hip  SPECIAL TESTS: decreased patellar motion on the R into inferior/superior motion, no issues with ligaments. positive JO PALPATION: high tissue tension in the R quad, hamstring, and hip flexor 30 deg difference in SLR on the R vs L with both limited  JOINT MOBILITY: decreased inferior and lateral motion at R hip     Assessment & Plan   CLINICAL IMPRESSIONS   Medical Diagnosis: R hip and knee pain    Treatment Diagnosis: gait deficits due to tissue tension and swelling of unknown origin   Impression/Assessment: Patient is a 53 year old female with stiffness and R LE swelling complaints.  The following significant findings have been identified: Decreased ROM/flexibility, Decreased joint mobility and Impaired gait. These impairments interfere with their ability to perform work tasks, recreational activities, household chores, household mobility and community mobility as compared to previous level of function.     Clinical Decision Making (Complexity):   Clinical Presentation: Evolving/Changing  Clinical Presentation Rationale: based on medical and personal factors listed in PT evaluation  Clinical Decision Making (Complexity): Moderate complexity    PLAN OF CARE  Treatment Interventions:  Modalities: Cryotherapy, E-stim, Hot Pack, Mechanical Traction, Ultrasound,  ketoprofen 10% with phonophoresis, Iontophoresis with dexamethazone 4%  Interventions: Gait Training, Manual Therapy, Neuromuscular Re-education, Therapeutic Activity, Therapeutic Exercise, Aquatic Therapy    Long Term Goals     PT Goal 1  Goal Identifier: swelling  Goal Description: Pt will demonstrate R above the knee swelling of 44 cm or less in order to progress towards size of L LE to be able to wear jeans  Target Date: 09/06/23  PT Goal 2  Goal Identifier: ROM  Goal Description: Pt will demonstrate 80 deg R SLR PROM to improve ROM for dressing and ADL's  Target Date: 08/23/23  PT Goal 3  Goal Identifier: gait  Goal Description: Pt will demonstrate equal step length B and equal R hip flexion for 200 feet as compared to the L in order to improve gait stability  Target Date: 08/23/23      Frequency of Treatment: 2x/week  Duration of Treatment: 8 weeks    Education Assessment:   Learner/Method: Patient    Risks and benefits of evaluation/treatment have been explained.   Patient/Family/caregiver agrees with Plan of Care.     Evaluation Time:     PT Eval, Moderate Complexity Minutes (75286): 20    Signing Clinician: Olga Lidia Betancourt DPT

## 2023-07-19 ENCOUNTER — THERAPY VISIT (OUTPATIENT)
Dept: PHYSICAL THERAPY | Facility: OTHER | Age: 53
End: 2023-07-19
Attending: PHYSICIAN ASSISTANT
Payer: COMMERCIAL

## 2023-07-19 DIAGNOSIS — G89.29 CHRONIC PAIN OF RIGHT KNEE: ICD-10-CM

## 2023-07-19 DIAGNOSIS — M25.561 CHRONIC PAIN OF RIGHT KNEE: ICD-10-CM

## 2023-07-19 DIAGNOSIS — M25.551 CHRONIC RIGHT HIP PAIN: Primary | ICD-10-CM

## 2023-07-19 DIAGNOSIS — G89.29 CHRONIC RIGHT HIP PAIN: Primary | ICD-10-CM

## 2023-07-19 PROCEDURE — 97035 APP MDLTY 1+ULTRASOUND EA 15: CPT | Mod: GP

## 2023-07-19 PROCEDURE — 97140 MANUAL THERAPY 1/> REGIONS: CPT | Mod: GP

## 2023-07-21 ENCOUNTER — THERAPY VISIT (OUTPATIENT)
Dept: PHYSICAL THERAPY | Facility: OTHER | Age: 53
End: 2023-07-21
Attending: PHYSICIAN ASSISTANT
Payer: COMMERCIAL

## 2023-07-21 DIAGNOSIS — M25.551 CHRONIC RIGHT HIP PAIN: Primary | ICD-10-CM

## 2023-07-21 DIAGNOSIS — M25.561 CHRONIC PAIN OF RIGHT KNEE: ICD-10-CM

## 2023-07-21 DIAGNOSIS — R60.0 LEG EDEMA, RIGHT: Primary | ICD-10-CM

## 2023-07-21 DIAGNOSIS — G89.29 CHRONIC PAIN OF RIGHT KNEE: ICD-10-CM

## 2023-07-21 DIAGNOSIS — G89.29 CHRONIC RIGHT HIP PAIN: Primary | ICD-10-CM

## 2023-07-21 PROCEDURE — 97140 MANUAL THERAPY 1/> REGIONS: CPT | Mod: GP

## 2023-07-28 ENCOUNTER — TELEPHONE (OUTPATIENT)
Dept: FAMILY MEDICINE | Facility: OTHER | Age: 53
End: 2023-07-28
Payer: COMMERCIAL

## 2023-07-28 NOTE — TELEPHONE ENCOUNTER
Patient called and requested a call back regarding the status of her CAT scan order. Patient stated she has yet to be contacted to be scheduled.    Okay to leave detailed message.    Palmira Veloz on 7/28/2023 at 4:11 PM

## 2023-07-31 NOTE — TELEPHONE ENCOUNTER
Patient has a CT scan ordered on 06/22/2023. The patient reports that she hasn't been contacted yet to schedule this. This writer told patient that a follow up on this will be done.  Sabrina South LPN

## 2023-07-31 NOTE — TELEPHONE ENCOUNTER
Left message for [facility] to see why and what more information is needed as the patient's CT scan was denied by insurance. Advised patient to reach out to her insurance company. Patient states she will do that and reach out to us with any further questions.  Sabrina South LPN

## 2023-08-23 ENCOUNTER — MYC MEDICAL ADVICE (OUTPATIENT)
Dept: FAMILY MEDICINE | Facility: OTHER | Age: 53
End: 2023-08-23
Payer: COMMERCIAL

## 2023-08-23 DIAGNOSIS — R10.84 CHRONIC GENERALIZED ABDOMINAL PAIN: Primary | ICD-10-CM

## 2023-08-23 DIAGNOSIS — G89.29 CHRONIC GENERALIZED ABDOMINAL PAIN: Primary | ICD-10-CM

## 2023-08-24 NOTE — TELEPHONE ENCOUNTER
6/22 CT Abdomen Pelvis denied.  [facility] to [facility] in [facility] Epic Chat as I can not find reasoning in chart, denial letter, etc.    Clarifying question sent to patient through [employer].    Modesta Mancera RN on 8/24/2023 at 4:09 PM    Jennifer in [facility]:  Let me resubmit as it does not look like I ever received a approval or denial on it.   It was resubmitted.    Patient update on [facility].    Modesta Mancera RN on 8/24/2023 at 4:14 PM    Jennifer in [facility]:  just received fax from insurance company, this has been denied, is not medically necessary.    Harpal'd up abdominal ultrasound and routing to PCP.    Modesta Mancera RN on 8/29/2023 at 9:29 AM

## 2023-08-29 ENCOUNTER — TELEPHONE (OUTPATIENT)
Dept: FAMILY MEDICINE | Facility: OTHER | Age: 53
End: 2023-08-29
Payer: COMMERCIAL

## 2023-08-29 NOTE — TELEPHONE ENCOUNTER
(Received Denial for CT Abdomen Pelvis, per insurance company this is not medically necessary.  Will fax denial to provider to do peer to peer)     Above message received.   Carmencita Hines LPN ..........8/29/2023 2:04 PM

## 2023-09-07 ENCOUNTER — HOSPITAL ENCOUNTER (OUTPATIENT)
Dept: ULTRASOUND IMAGING | Facility: OTHER | Age: 53
Discharge: HOME | End: 2023-09-07
Attending: FAMILY MEDICINE | Admitting: FAMILY MEDICINE
Payer: COMMERCIAL

## 2023-09-07 DIAGNOSIS — G89.29 CHRONIC GENERALIZED ABDOMINAL PAIN: ICD-10-CM

## 2023-09-07 DIAGNOSIS — R10.84 CHRONIC GENERALIZED ABDOMINAL PAIN: ICD-10-CM

## 2023-09-07 PROCEDURE — 76700 US EXAM ABDOM COMPLETE: CPT

## 2023-09-12 ENCOUNTER — MYC MEDICAL ADVICE (OUTPATIENT)
Dept: FAMILY MEDICINE | Facility: OTHER | Age: 53
End: 2023-09-12
Payer: COMMERCIAL

## 2023-09-12 DIAGNOSIS — R10.84 CHRONIC GENERALIZED ABDOMINAL PAIN: Primary | ICD-10-CM

## 2023-09-12 DIAGNOSIS — G89.29 CHRONIC GENERALIZED ABDOMINAL PAIN: Primary | ICD-10-CM

## 2023-09-12 PROBLEM — M25.551 CHRONIC RIGHT HIP PAIN: Status: RESOLVED | Noted: 2023-07-12 | Resolved: 2023-09-12

## 2023-09-12 PROBLEM — M25.561 CHRONIC PAIN OF RIGHT KNEE: Status: RESOLVED | Noted: 2023-07-12 | Resolved: 2023-09-12

## 2023-09-12 NOTE — PROGRESS NOTES
07/21/23 0500   Appointment Info   Signing clinician's name / credentials Olga Lidia Betancourt DPT   Total/Authorized Visits 3   Visits Used 3 of 10   Medical Diagnosis R hip and knee pain   PT Tx Diagnosis gait deficits due to tissue tension and swelling of unknown origin   Progress Note/Certification   Onset of illness/injury or Date of Surgery 05/23/23  (has been going on for years)   Therapy Frequency 2x/week   Predicted Duration 8 weeks   Progress Note Completed Date 07/12/23   GOALS   PT Goals 2;3   PT Goal 1   Goal Identifier swelling   Goal Description Pt will demonstrate R above the knee swelling of 44 cm or less in order to progress towards size of L LE to be able to wear jeans   Goal Progress goals not met, likely needs edema PT   Target Date 09/06/23   PT Goal 2   Goal Identifier ROM   Goal Description Pt will demonstrate 80 deg R SLR PROM to improve ROM for dressing and ADL's   Goal Progress Pt to seek PT order for edema evaluation   Target Date 08/23/23   PT Goal 3   Goal Identifier gait   Goal Description Pt will demonstrate equal step length B and equal R hip flexion for 200 feet as compared to the L in order to improve gait stability   Goal Progress goals not met   Target Date 08/23/23   Subjective Report   Subjective Report pt reports no change would like to d/c and wait to see swelling therapist.   Objective Measures   Objective Measures Objective Measure 1;Objective Measure 2   Objective Measure 1   Objective Measure pain   Details 5/10   Objective Measure 2   Objective Measure ROM   Details 60 deg R SLR in supine   PT Modalities   PT Modalities Ultrasound   Ultrasound   Treatment Detail US to R above the knee for 10 min at 0.8 intensity, 50% duty cycle, 1.0 depth for edema management.   Treatment Interventions (PT)   Interventions Therapeutic Procedure/Exercise;Manual Therapy   Therapeutic Procedure/Exercise   Ther Proc 1 stretching   Ther Proc 1 - Details 30 sec: supine glut and piriformis  stretch, standing hip flexor and quad stretch, seated hamstring stretch, increased time to determine best quad stretch   Skilled Intervention education, demonstration   Patient Response/Progress good tolerance, minimal pain   Manual Therapy   Manual Therapy: Mobilization, MFR, MLD, friction massage minutes ([id number]) 40   Manual Therapy 1 tissue assessment   Manual Therapy 1 - Details STM to R hip, IT band, and quad and QL.IASTM with rolling pin to IT band and quad   Patient Response/Progress high tissut tension throught   Skilled Intervention educated on how to use therapy ball at home for back stretching   Education   Learner/Method Patient   Plan   Home program [id number]   Plan for next session discharge   Total Session Time   Timed Code Treatment Minutes 40   Total Treatment Time (sum of timed and untimed services) 40         DISCHARGE  Reason for Discharge: pt needs to have order for edema management and see another PT for specific wrapping    Equipment Issued: HEP    Discharge Plan: Other services: PT order for edema management.    Referring Provider:  Imelda Sexton

## 2023-10-04 ENCOUNTER — OFFICE VISIT (OUTPATIENT)
Dept: FAMILY MEDICINE | Facility: OTHER | Age: 53
End: 2023-10-04
Attending: PHYSICIAN ASSISTANT
Payer: COMMERCIAL

## 2023-10-04 VITALS
OXYGEN SATURATION: 98 % | HEART RATE: 106 BPM | RESPIRATION RATE: 18 BRPM | DIASTOLIC BLOOD PRESSURE: 75 MMHG | WEIGHT: 183.2 LBS | SYSTOLIC BLOOD PRESSURE: 135 MMHG | TEMPERATURE: 97.8 F | BODY MASS INDEX: 30.52 KG/M2 | HEIGHT: 65 IN

## 2023-10-04 DIAGNOSIS — R10.31 RIGHT LOWER QUADRANT PAIN: Primary | ICD-10-CM

## 2023-10-04 DIAGNOSIS — M70.61 TROCHANTERIC BURSITIS OF RIGHT HIP: ICD-10-CM

## 2023-10-04 DIAGNOSIS — R14.0 ABDOMINAL BLOATING: ICD-10-CM

## 2023-10-04 DIAGNOSIS — R19.8 ALTERED BOWEL FUNCTION: ICD-10-CM

## 2023-10-04 PROCEDURE — 99214 OFFICE O/P EST MOD 30 MIN: CPT | Performed by: PHYSICIAN ASSISTANT

## 2023-10-04 ASSESSMENT — PAIN SCALES - GENERAL: PAINLEVEL: NO PAIN (0)

## 2023-10-04 NOTE — NURSING NOTE
"Chief Complaint   Patient presents with    Follow Up     Ultrasound from 09/07/2023     Patient reports pain in the right side from middle back to middle of stomach, present for 2 years. Patient states that she had an ultrasound done on the right side but no scan on the abdomen. Patient reports that she results via [employer] but has not had no follow-up.    Initial /75 (BP Location: Left arm, Patient Position: Sitting, Cuff Size: Adult Regular)   Pulse 106   Temp 97.8  F (36.6  C) (Temporal)   Resp 18   Ht 1.651 m (5' 5\")   Wt 83.1 kg (183 lb 3.2 oz)   LMP  (LMP Unknown)   SpO2 98%   Breastfeeding No   BMI 30.49 kg/m   Estimated body mass index is 30.49 kg/m  as calculated from the following:    Height as of this encounter: 1.651 m (5' 5\").    Weight as of this encounter: 83.1 kg (183 lb 3.2 oz).       FOOD SECURITY SCREENING QUESTIONS:    The next two questions are to help us understand your food security.  If you are feeling you need any assistance in this area, we have resources available to support you today.    Hunger Vital Signs:  Within the past 12 months we worried whether our food would run out before we got money to buy more. Never  Within the past 12 months the food we bought just didn't last and we didn't have money to get more. Never  Gracia Sepulveda LPN on 10/4/2023 at 10:14 AM     Gracia Sepulveda   "

## 2023-10-04 NOTE — PROGRESS NOTES
"  Assessment & Plan       ICD-10-CM    1. Right lower quadrant pain  R10.31 CTA Abdomen Pelvis with Contrast     CANCELED: CT Abdomen Pelvis w/o & w Contrast      2. Abdominal bloating  R14.0 CTA Abdomen Pelvis with Contrast     CANCELED: CT Abdomen Pelvis w/o & w Contrast      3. Altered bowel function  R19.8 CTA Abdomen Pelvis with Contrast     CANCELED: CT Abdomen Pelvis w/o & w Contrast      4. Trochanteric bursitis of right hip  M70.61         Abdominal pain x2 years duration.  She does have right lower quadrant tenderness today, negative special testing.  Reviewed ultrasound findings at length, reviewed that there is sludge within the gallbladder, no large stones, small stones cannot be ruled out, attribute this to sludge.  Pancreas, spleen, liver and kidneys are normal in appearance per review of ultrasound.  Based off her symptoms I would recommend a CT scan of the abdomen with contrast to further assess internal anatomy, she is quite agreeable to this.  She may also benefit from meeting with a surgeon if her referred sludge pain persist.  Recommend to monitor for dietary triggers acidic/spicy foods, greasy foods, etc.  I do recommend to continue with low-fat diet as able.  Strict return precautions were reviewed.  See #1  See #1  Incidental finding of trochanteric bursitis of the right hip, recommend gentle stretching, heat, massage, Tylenol (limited use of NSAIDs as this may flareup gallbladder).     BMI:   Estimated body mass index is 30.49 kg/m  as calculated from the following:    Height as of this encounter: 1.651 m (5' 5\").    Weight as of this encounter: 83.1 kg (183 lb 3.2 oz).   Weight management plan: Discussed healthy diet and exercise guidelines    See Patient Instructions    30 minutes minutes spent the patient care approximately 85% of this was spent directly with patient. Remainder on activities per review of chart - documentation, imaging review, etc.     Return for Imaging ordered, you will " be contacted to schedule.    Maira Bourgeois PA-C  [facility]    Subjective   Julia is a 53 year old, presenting for the following health issues:  Follow Up (Ultrasound from 09/07/2023)        10/4/2023    10:10 AM   Additional Questions   Roomed by Gracia RODRIGUEZ CNA/ZAHEER       History of Present Illness       Reason for visit:  Follow up on stomach issues and ultrasound results    She eats 0-1 servings of fruits and vegetables daily.She consumes 0 sweetened beverage(s) daily.She exercises with enough effort to increase her heart rate 20 to 29 minutes per day.  She exercises with enough effort to increase her heart rate 3 or less days per week.   She is taking medications regularly.       Julia presents today to follow-up on ultrasound from 9/7/2023.  She has had chronic abdominal pain that is primarily on her right side and radiates from the middle of her back to the middle of her stomach.  This has been present for approximately 2 years.  PCP and patient initially tried to get a CAT scan covered, unfortunately this was declined, resulting in ultrasound.    Ultrasound showed echogenic material in the gallbladder, most likely sludge.  Radiology could not rule out small stones.  PCP recommended zero-fat diet and dietary modifications as well as close follow-up with primary care team and surgery if symptoms persist despite diet changes.    Today, her pain is primarily in the right lower region of her abdomen.  She eats approximately 1 meal per day, this depends on her schedule which meal of the day she eats.  She did try a minimal fat diet, this is very difficult as she does only eat 1 meal a day.  The abdominal pain radiates between right upper, right lower and back region.  This is constant all day and is a gnawing sensation.  She declines any nausea, vomiting or diarrhea.  She does have chronic loose stools, she has 1 solid bowel movement per week on average, her last solid bowel movement was  "approximately 1 month ago.  She is on MiraLAX per her primary care provider to help prevent constipation.  She declines any belching or reflux symptoms.  She declines any urinary symptoms (urgency, frequency, etc.).  She declines any blood or mucus in the stool.  She declines any bloody emesis. No history of peptic ulcer or acid reflux, pancreatitis, appendicitis, gallbladder pathology, liver or kidney disease.  She very rarely takes NSAIDs, no tobacco or alcohol use.  Chronic medications: Metformin, Crestor and lisinopril.  No recent travel.  Last menstrual period was approximately 2 years ago.    Review of Systems   Constitutional, HEENT, cardiovascular, pulmonary, GI, , musculoskeletal, neuro, skin, endocrine and psych systems are negative, except as otherwise noted.        Objective    /75 (BP Location: Left arm, Patient Position: Sitting, Cuff Size: Adult Regular)   Pulse 106   Temp 97.8  F (36.6  C) (Temporal)   Resp 18   Ht 1.651 m (5' 5\")   Wt 83.1 kg (183 lb 3.2 oz)   LMP  (LMP Unknown)   SpO2 98%   Breastfeeding No   BMI 30.49 kg/m    Body mass index is 30.49 kg/m .  Physical Exam   GENERAL: healthy, alert and no distress  EYES: Eyes grossly normal to inspection, PERRL and conjunctivae and sclerae normal  NECK: no adenopathy, no asymmetry, masses, or scars and thyroid normal to palpation  RESP: lungs clear to auscultation - no rales, rhonchi or wheezes  CV: regular rate and rhythm, normal S1 S2, no S3 or S4, no murmur, click or rub, no peripheral edema and peripheral pulses strong  ABDOMEN: tenderness RLQ, no organomegaly or masses, bowel sounds normal, no palpable or pulsatile masses, umbilicus normal, no bruits heard, and no palpable renal abnormalities.  Negative rebound, referred rebound, Rovsing, McBurney point and Mayfield signs.  No evidence of hernia.  MS: Prominent tenderness over the right inguinal region and right trochanteric bursa.  Full bilateral hip range of motion, leg length " is equal.  SKIN: no suspicious lesions or rashes  NEURO: Normal strength and tone, mentation intact and speech normal  PSYCH: mentation appears normal, affect normal/bright  LYMPH: normal ant/post cervical, supraclavicular nodes

## 2023-10-04 NOTE — PATIENT INSTRUCTIONS
CT scan of abdomen and pelvis ordered, you will be contacted to schedule, however, if you do not hear anything in 2-3 business days, please call the below number

## 2023-10-25 ENCOUNTER — APPOINTMENT (OUTPATIENT)
Dept: LAB | Facility: OTHER | Age: 53
End: 2023-10-25
Attending: FAMILY MEDICINE
Payer: COMMERCIAL

## 2023-10-25 ENCOUNTER — OFFICE VISIT (OUTPATIENT)
Dept: FAMILY MEDICINE | Facility: OTHER | Age: 53
End: 2023-10-25
Attending: FAMILY MEDICINE
Payer: COMMERCIAL

## 2023-10-25 VITALS
WEIGHT: 178.8 LBS | SYSTOLIC BLOOD PRESSURE: 132 MMHG | TEMPERATURE: 97.9 F | BODY MASS INDEX: 29.75 KG/M2 | DIASTOLIC BLOOD PRESSURE: 82 MMHG | OXYGEN SATURATION: 98 % | HEART RATE: 97 BPM | RESPIRATION RATE: 18 BRPM

## 2023-10-25 DIAGNOSIS — R10.9 RIGHT FLANK PAIN, CHRONIC: ICD-10-CM

## 2023-10-25 DIAGNOSIS — G89.29 RIGHT FLANK PAIN, CHRONIC: ICD-10-CM

## 2023-10-25 DIAGNOSIS — E11.9 TYPE 2 DIABETES MELLITUS WITHOUT COMPLICATION, WITHOUT LONG-TERM CURRENT USE OF INSULIN (H): Primary | ICD-10-CM

## 2023-10-25 LAB
ALBUMIN SERPL BCG-MCNC: 4.7 G/DL (ref 3.5–5.2)
ALP SERPL-CCNC: 76 U/L (ref 35–104)
ALT SERPL W P-5'-P-CCNC: 19 U/L (ref 0–50)
ANION GAP SERPL CALCULATED.3IONS-SCNC: 11 MMOL/L (ref 7–15)
AST SERPL W P-5'-P-CCNC: 17 U/L (ref 0–45)
BILIRUB SERPL-MCNC: 0.5 MG/DL
BUN SERPL-MCNC: 10.7 MG/DL (ref 6–20)
CALCIUM SERPL-MCNC: 9.9 MG/DL (ref 8.6–10)
CHLORIDE SERPL-SCNC: 103 MMOL/L (ref 98–107)
CHOLEST SERPL-MCNC: 192 MG/DL
CREAT SERPL-MCNC: 0.78 MG/DL (ref 0.51–0.95)
CREAT UR-MCNC: 56.5 MG/DL
DEPRECATED HCO3 PLAS-SCNC: 24 MMOL/L (ref 22–29)
EGFRCR SERPLBLD CKD-EPI 2021: >90 ML/MIN/1.73M2
ERYTHROCYTE [DISTWIDTH] IN BLOOD BY AUTOMATED COUNT: 13.5 % (ref 10–15)
GLUCOSE SERPL-MCNC: 169 MG/DL (ref 70–99)
HBA1C MFR BLD: 6 % (ref 4–6.2)
HCT VFR BLD AUTO: 41 % (ref 35–47)
HDLC SERPL-MCNC: 54 MG/DL
HGB BLD-MCNC: 14.3 G/DL (ref 11.7–15.7)
LDLC SERPL CALC-MCNC: 107 MG/DL
MCH RBC QN AUTO: 29 PG (ref 26.5–33)
MCHC RBC AUTO-ENTMCNC: 34.9 G/DL (ref 31.5–36.5)
MCV RBC AUTO: 83 FL (ref 78–100)
MICROALBUMIN UR-MCNC: <12 MG/L
MICROALBUMIN/CREAT UR: NORMAL MG/G{CREAT}
NONHDLC SERPL-MCNC: 138 MG/DL
PLATELET # BLD AUTO: 202 10E3/UL (ref 150–450)
POTASSIUM SERPL-SCNC: 4.2 MMOL/L (ref 3.4–5.3)
PROT SERPL-MCNC: 7.3 G/DL (ref 6.4–8.3)
RBC # BLD AUTO: 4.93 10E6/UL (ref 3.8–5.2)
SODIUM SERPL-SCNC: 138 MMOL/L (ref 135–145)
TRIGL SERPL-MCNC: 153 MG/DL
WBC # BLD AUTO: 5 10E3/UL (ref 4–11)

## 2023-10-25 PROCEDURE — 99214 OFFICE O/P EST MOD 30 MIN: CPT | Performed by: FAMILY MEDICINE

## 2023-10-25 PROCEDURE — 83036 HEMOGLOBIN GLYCOSYLATED A1C: CPT | Mod: ZL | Performed by: FAMILY MEDICINE

## 2023-10-25 PROCEDURE — 80053 COMPREHEN METABOLIC PANEL: CPT | Mod: ZL | Performed by: FAMILY MEDICINE

## 2023-10-25 PROCEDURE — 80061 LIPID PANEL: CPT | Mod: ZL | Performed by: FAMILY MEDICINE

## 2023-10-25 PROCEDURE — 36415 COLL VENOUS BLD VENIPUNCTURE: CPT | Mod: ZL | Performed by: FAMILY MEDICINE

## 2023-10-25 PROCEDURE — 85027 COMPLETE CBC AUTOMATED: CPT | Mod: ZL | Performed by: FAMILY MEDICINE

## 2023-10-25 PROCEDURE — 82570 ASSAY OF URINE CREATININE: CPT | Mod: ZL | Performed by: FAMILY MEDICINE

## 2023-10-25 ASSESSMENT — PAIN SCALES - GENERAL: PAINLEVEL: NO PAIN (0)

## 2023-10-25 NOTE — PROGRESS NOTES
Assessment & Plan     (E11.9) Type 2 diabetes mellitus without complication, without long-term current use of insulin (H)  (primary encounter diagnosis)  Comment: it is not clear how she dropped her A1c so dramatically over the last year. This typically is from either significant weight loss, diet changes and meds.   Plan: Hemoglobin A1c, Albumin Random Urine         Quantitative with Creat Ratio, Lipid Panel,         Comprehensive Metabolic Panel             (R10.9,  G89.29) Right flank pain, chronic  Comment: does not appear to be MSK, given it is constant and not affected by positioning. With radiating into the groin area, I am concerned it is renal colic. Will attempt to get the CT scan again, with IV contrast. Her gal bladder sludge might be playing a role, and if the CT is unremarkable, could consider a MUGA scan or a gen surgery consult.   Plan: CT Abdomen Pelvis w Contrast                          No follow-ups on file.    Shorty Fajardo MD  [facility] AND [facility]   Julia is a 53 year old, presenting for the following health issues:  Diabetes        10/25/2023     8:25 AM   Additional Questions   Roomed by KALLI Bennett   Accompanied by Self         10/25/2023     8:25 AM   Patient Reported Additional Medications   Patient reports taking the following new medications N/A       History of Present Illness       She eats 0-1 servings of fruits and vegetables daily.She consumes 0 sweetened beverage(s) daily.She exercises with enough effort to increase her heart rate 20 to 29 minutes per day.  She exercises with enough effort to increase her heart rate 3 or less days per week.   She is taking medications regularly.     Diabetes follow up and ongoing right flank pain. Has had the pain for nearly 2 years. Has had a CT ordered but this was denied by insurance. She adds it was denied because it was both with and without contrast. No blood in urine. Pain is constant, no changes with twisting.  She does stretches without relief. Radiates into the upper left spine and lower T spine. No significant pain when she pushes on the area.no rash. At times it will radiate into the right groin area. Had an ultrasound, showing sludge in the gal bladder. Is only eating once a day not hungry. Has lost 2# since the last visit. Max weight was 217# in 2020. Has a BM once a week, loose. Has never had a rash in the area, no history shingles.     Not checking her sugars. Has not had an eye check in a year. Has no insurance coverage for this.       Current Outpatient Medications:     lisinopril (ZESTRIL) 10 MG tablet, Take 1 tablet (10 mg) by mouth daily, Disp: 90 tablet, Rfl: 3    metFORMIN (GLUCOPHAGE) 1000 MG tablet, Take 1 tablet (1,000 mg) by mouth 2 times daily (with meals), Disp: 180 tablet, Rfl: 3    rosuvastatin (CRESTOR) 10 MG tablet, Take 1 tablet (10 mg) by mouth daily, Disp: 90 tablet, Rfl: 3    Recent Labs   Lab Test 06/22/23  0917 05/23/23  1524 02/16/23  0822 10/28/22  0835 11/30/21  1846 09/14/21  1858 03/09/21  1924 11/26/19  1940   A1C  --  6.2 8.0* 11.2* 7.0*   < > 8.7*  --    LDL  --   --   --  97 122*  --  220* 185*   HDL  --   --   --  30 51  --  50 44   TRIG  --   --   --  169* 212*  --  301* 301*   ALT 27  --   --   --   --   --  28  --    CR 0.75  --   --  0.94 0.84   < > 0.89 0.85   GFRESTIMATED >90  --   --  73 81   < > 67 71   GFRESTBLACK  --   --   --   --   --   --  81 86   POTASSIUM 4.4  --   --  4.4 3.8   < > 4.1 3.5    < > = values in this interval not displayed.                          Review of Systems         Objective    /82   Pulse 97   Temp 97.9  F (36.6  C) (Tympanic)   Resp 18   Wt 81.1 kg (178 lb 12.8 oz)   LMP  (LMP Unknown)   SpO2 98%   BMI 29.75 kg/m    Body mass index is 29.75 kg/m .  Physical Exam   GENERAL: healthy, alert and no distress  RESP: lungs clear to auscultation - no rales, rhonchi or wheezes  CV: regular rate and rhythm, normal S1 S2, no S3 or S4, no  murmur, click or rub, no peripheral edema and peripheral pulses strong  ABDOMEN: soft, nontender, no hepatosplenomegaly, no masses and bowel sounds normal. No CVA tenderness on percussion.   Diabetic foot exam: normal DP and PT pulses, no trophic changes or ulcerative lesions, and normal sensory exam    Results for orders placed or performed in visit on 10/25/23   Hemoglobin A1c     Status: Normal   Result Value Ref Range    Hemoglobin A1C 6.0 4.0 - 6.2 %   Albumin Random Urine Quantitative with Creat Ratio     Status: None   Result Value Ref Range    Creatinine Urine mg/dL 56.5 mg/dL    Albumin Urine mg/L <12.0 mg/L    Albumin Urine mg/g Cr     Lipid Panel     Status: Abnormal   Result Value Ref Range    Cholesterol 192 <200 mg/dL    Triglycerides 153 (H) <150 mg/dL    Direct Measure HDL 54 >=50 mg/dL    LDL Cholesterol Calculated 107 (H) <=100 mg/dL    Non HDL Cholesterol 138 (H) <130 mg/dL    Narrative    Cholesterol  Desirable:  <200 mg/dL    Triglycerides  Normal:  Less than 150 mg/dL  Borderline High:  150-199 mg/dL  High:  200-499 mg/dL  Very High:  Greater than or equal to 500 mg/dL    Direct Measure HDL  Female:  Greater than or equal to 50 mg/dL   Male:  Greater than or equal to 40 mg/dL    LDL Cholesterol  Desirable:  <100mg/dL  Above Desirable:  100-129 mg/dL   Borderline High:  130-159 mg/dL   High:  160-189 mg/dL   Very High:  >= 190 mg/dL    Non HDL Cholesterol  Desirable:  130 mg/dL  Above Desirable:  130-159 mg/dL  Borderline High:  160-189 mg/dL  High:  190-219 mg/dL  Very High:  Greater than or equal to 220 mg/dL   Comprehensive Metabolic Panel     Status: Abnormal   Result Value Ref Range    Sodium 138 135 - 145 mmol/L    Potassium 4.2 3.4 - 5.3 mmol/L    Carbon Dioxide (CO2) 24 22 - 29 mmol/L    Anion Gap 11 7 - 15 mmol/L    Urea Nitrogen 10.7 6.0 - 20.0 mg/dL    Creatinine 0.78 0.51 - 0.95 mg/dL    GFR Estimate >90 >60 mL/min/1.73m2    Calcium 9.9 8.6 - 10.0 mg/dL    Chloride 103 98 - 107  mmol/L    Glucose 169 (H) 70 - 99 mg/dL    Alkaline Phosphatase 76 35 - 104 U/L    AST 17 0 - 45 U/L    ALT 19 0 - 50 U/L    Protein Total 7.3 6.4 - 8.3 g/dL    Albumin 4.7 3.5 - 5.2 g/dL    Bilirubin Total 0.5 <=1.2 mg/dL   CBC W PLT No Diff     Status: Normal   Result Value Ref Range    WBC Count 5.0 4.0 - 11.0 10e3/uL    RBC Count 4.93 3.80 - 5.20 10e6/uL    Hemoglobin 14.3 11.7 - 15.7 g/dL    Hematocrit 41.0 35.0 - 47.0 %    MCV 83 78 - 100 fL    MCH 29.0 26.5 - 33.0 pg    MCHC 34.9 31.5 - 36.5 g/dL    RDW 13.5 10.0 - 15.0 %    Platelet Count 202 150 - 450 10e3/uL

## 2023-10-25 NOTE — NURSING NOTE
"Chief Complaint   Patient presents with    Diabetes       Initial /82   Pulse 97   Temp 97.9  F (36.6  C) (Tympanic)   Resp 18   Wt 81.1 kg (178 lb 12.8 oz)   LMP  (LMP Unknown)   SpO2 98%   BMI 29.75 kg/m   Estimated body mass index is 29.75 kg/m  as calculated from the following:    Height as of 10/4/23: 1.651 m (5' 5\").    Weight as of this encounter: 81.1 kg (178 lb 12.8 oz).  Medication Reconciliation: complete        "

## 2023-11-07 ENCOUNTER — TELEPHONE (OUTPATIENT)
Dept: FAMILY MEDICINE | Facility: OTHER | Age: 53
End: 2023-11-07

## 2023-11-09 NOTE — TELEPHONE ENCOUNTER
I too cannot explain what is happening, so a call from me is not going to help. The most recent CT I ordered was denied, because insurance says it was already done and they do not want to pay for a second one. It was not done because we have told her it was denied (the first one). The [employer] should be able to sort this out I think? Or Fausto who does the CTs?

## 2023-11-09 NOTE — TELEPHONE ENCOUNTER
Orders (Patient called to schedule CT scan, per Epic and [employer] it is still denied. The patient is stating she has an approval number and is confused on this. Patient would like a call from provider or nurse to clarify as the provider cancelled previous authorized CT scan and she is frustrated and wants this done immediately.     Blessing Mortensen on 11/7/2023 at 1:54 PM)

## 2023-11-13 ENCOUNTER — HOSPITAL ENCOUNTER (OUTPATIENT)
Dept: CT IMAGING | Facility: OTHER | Age: 53
Discharge: HOME | End: 2023-11-13
Attending: PHYSICIAN ASSISTANT | Admitting: PHYSICIAN ASSISTANT
Payer: COMMERCIAL

## 2023-11-13 DIAGNOSIS — R10.31 RIGHT LOWER QUADRANT PAIN: ICD-10-CM

## 2023-11-13 DIAGNOSIS — R14.0 ABDOMINAL BLOATING: ICD-10-CM

## 2023-11-13 DIAGNOSIS — R19.8 ALTERED BOWEL FUNCTION: ICD-10-CM

## 2023-11-13 PROCEDURE — 250N000011 HC RX IP 250 OP 636: Performed by: PHYSICIAN ASSISTANT

## 2023-11-13 PROCEDURE — 74177 CT ABD & PELVIS W/CONTRAST: CPT

## 2023-11-13 RX ORDER — IOPAMIDOL 755 MG/ML
103 INJECTION, SOLUTION INTRAVASCULAR ONCE
Status: COMPLETED | OUTPATIENT
Start: 2023-11-13 | End: 2023-11-13

## 2023-11-13 RX ADMIN — IOPAMIDOL 103 ML: 755 INJECTION, SOLUTION INTRAVENOUS at 14:47

## 2024-01-17 DIAGNOSIS — E11.9 TYPE 2 DIABETES MELLITUS WITHOUT COMPLICATION, UNSPECIFIED WHETHER LONG TERM INSULIN USE (H): ICD-10-CM

## 2024-01-17 DIAGNOSIS — I10 BENIGN ESSENTIAL HYPERTENSION: ICD-10-CM

## 2024-01-18 RX ORDER — ROSUVASTATIN CALCIUM 10 MG/1
10 TABLET, COATED ORAL DAILY
Qty: 90 TABLET | Refills: 3 | Status: SHIPPED | OUTPATIENT
Start: 2024-01-18

## 2024-01-18 RX ORDER — LISINOPRIL 10 MG/1
10 TABLET ORAL DAILY
Qty: 90 TABLET | Refills: 3 | Status: SHIPPED | OUTPATIENT
Start: 2024-01-18

## 2024-01-18 NOTE — TELEPHONE ENCOUNTER
[id number] sent Rx request for the following:      Requested Prescriptions   Pending Prescriptions Disp Refills    lisinopril (ZESTRIL) 10 MG tablet [Pharmacy Med Name: LISINOPRIL 10MG TABLET] 90 tablet 3     Sig: TAKE 1 TABLET (10 MG) BY MOUTH DAILY       ACE Inhibitors (Including Combos) Protocol Failed - 1/18/2024  2:30 PM        Failed - Has GFR on file in past 12 months and most recent value is normal   Last Prescription Date:   2/16/23  Last Fill Qty/Refills:         90, R-3            rosuvastatin (CRESTOR) 10 MG tablet [Pharmacy Med Name: ROSUVASTATIN 10MG TABLET] 90 tablet 3     Sig: TAKE 1 TABLET BY MOUTH DAILY     Last Prescription Date:   2/16/23  Last Fill Qty/Refills:         90, R-3          metFORMIN (GLUCOPHAGE) 1000 MG tablet [Pharmacy Med Name: METFORMIN 1000MG TABLET] 180 tablet 3     Sig: TAKE 1 TABLET BY MOUTH TWICE A DAY WITH MEALS       Biguanide Agents Failed - 1/18/2024  2:31 PM        Failed - Has GFR on file in past 12 months and most recent value is normal       Last Prescription Date:   2/16/23  Last Fill Qty/Refills:         180, R-3    Last Office Visit:              10/25/23   Future Office visit:           none    Will route to schedulers, patient is due for annual exam.    Routing refill request to provider for review/approval because:  Drug not on the [facility] refill protocol     Lyndsey Sevilla RN on 1/18/2024 at 2:33 PM

## 2025-01-12 DIAGNOSIS — I10 BENIGN ESSENTIAL HYPERTENSION: ICD-10-CM

## 2025-01-14 RX ORDER — LISINOPRIL 10 MG/1
10 TABLET ORAL DAILY
Qty: 90 TABLET | Refills: 0 | Status: SHIPPED | OUTPATIENT
Start: 2025-01-14

## 2025-01-14 NOTE — TELEPHONE ENCOUNTER
[id number] sent Rx request for the following:      Requested Prescriptions   Pending Prescriptions Disp Refills    lisinopril (ZESTRIL) 10 MG tablet [Pharmacy Med Name: LISINOPRIL 10MG TABLET] 90 tablet 3     Sig: TAKE 1 TABLET (10 MG) BY MOUTH DAILY       ACE Inhibitors (Including Combos) Protocol Failed - 1/14/2025  1:01 PM        Failed - Most recent blood pressure under 140/90 in past 12 months- Clinicial or Patient Reported     BP Readings from Last 3 Encounters:   10/25/23 132/82   10/04/23 135/75   06/22/23 126/78       No data recorded            Failed - Recent (12 mo) or future (90 days) visit within the authorizing provider's specialty     The patient must have completed an in-person or virtual visit within the past 12 months or has a future visit scheduled within the next 90 days with the authorizing provider s specialty.  Urgent care and e-visits do not qualify as an office visit for this protocol.          Failed - Most recent GFR on file in the past 12 months >30        Failed - Normal serum potassium on file in past 12 months     Recent Labs   Lab Test 10/25/23  0853   POTASSIUM 4.2            Last Prescription Date:   1/18/24  Last Fill Qty/Refills:         90, R-3    Last Office Visit:              2/16/23   Future Office visit:           none  Will route to unit schedulers, patient is due for annual wellness visit.  Routing refill request to provider for review/approval because:  Labs out of range:  GFR, Potassium, BP    Lyndsey Sevilla RN on 1/14/2025 at 1:03 PM

## 2025-03-21 ENCOUNTER — MYC MEDICAL ADVICE (OUTPATIENT)
Dept: FAMILY MEDICINE | Facility: OTHER | Age: 55
End: 2025-03-21

## 2025-03-21 DIAGNOSIS — E11.9 TYPE 2 DIABETES MELLITUS WITHOUT COMPLICATION, WITHOUT LONG-TERM CURRENT USE OF INSULIN (H): Primary | ICD-10-CM

## 2025-03-21 PROBLEM — R60.0 LEG EDEMA, RIGHT: Status: ACTIVE | Noted: 2025-03-21

## 2025-03-25 NOTE — TELEPHONE ENCOUNTER
Now wanting to switch from Ozempic to Wegovy.     Has not started Ozempic yet.     Harpal'd up order.     Routing to provider to review and respond.  Loy Agudelo RN on 3/25/2025 at 12:01 PM

## 2025-04-18 ENCOUNTER — HOSPITAL ENCOUNTER (OUTPATIENT)
Dept: MRI IMAGING | Facility: OTHER | Age: 55
Discharge: HOME | End: 2025-04-18
Attending: FAMILY MEDICINE | Admitting: FAMILY MEDICINE
Payer: COMMERCIAL

## 2025-04-18 DIAGNOSIS — R60.0 LEG EDEMA, RIGHT: ICD-10-CM

## 2025-04-18 PROCEDURE — 73718 MRI LOWER EXTREMITY W/O DYE: CPT | Mod: 26 | Performed by: RADIOLOGY

## 2025-04-18 PROCEDURE — 73718 MRI LOWER EXTREMITY W/O DYE: CPT | Mod: RT

## 2025-05-02 ENCOUNTER — HOSPITAL ENCOUNTER (OUTPATIENT)
Dept: MAMMOGRAPHY | Facility: OTHER | Age: 55
Discharge: HOME | End: 2025-05-02
Attending: FAMILY MEDICINE | Admitting: FAMILY MEDICINE
Payer: COMMERCIAL

## 2025-05-02 DIAGNOSIS — Z12.31 VISIT FOR SCREENING MAMMOGRAM: ICD-10-CM

## 2025-05-02 PROCEDURE — 77067 SCR MAMMO BI INCL CAD: CPT

## 2025-05-02 PROCEDURE — 77067 SCR MAMMO BI INCL CAD: CPT | Mod: 26 | Performed by: RADIOLOGY

## 2025-05-02 PROCEDURE — 77063 BREAST TOMOSYNTHESIS BI: CPT | Mod: 26 | Performed by: RADIOLOGY

## 2025-05-17 ENCOUNTER — HEALTH MAINTENANCE LETTER (OUTPATIENT)
Age: 55
End: 2025-05-17

## 2025-06-28 ENCOUNTER — HEALTH MAINTENANCE LETTER (OUTPATIENT)
Age: 55
End: 2025-06-28

## 2025-06-30 ENCOUNTER — OFFICE VISIT (OUTPATIENT)
Dept: FAMILY MEDICINE | Facility: OTHER | Age: 55
End: 2025-06-30
Attending: FAMILY MEDICINE
Payer: COMMERCIAL

## 2025-06-30 VITALS
RESPIRATION RATE: 14 BRPM | SYSTOLIC BLOOD PRESSURE: 110 MMHG | BODY MASS INDEX: 27.38 KG/M2 | WEIGHT: 162 LBS | DIASTOLIC BLOOD PRESSURE: 68 MMHG | HEART RATE: 100 BPM | OXYGEN SATURATION: 99 % | TEMPERATURE: 97.5 F

## 2025-06-30 DIAGNOSIS — E11.9 TYPE 2 DIABETES MELLITUS WITHOUT COMPLICATION, WITHOUT LONG-TERM CURRENT USE OF INSULIN (H): Primary | ICD-10-CM

## 2025-06-30 DIAGNOSIS — I10 BENIGN ESSENTIAL HYPERTENSION: ICD-10-CM

## 2025-06-30 DIAGNOSIS — E78.49 OTHER HYPERLIPIDEMIA: ICD-10-CM

## 2025-06-30 LAB
CHOLEST SERPL-MCNC: 161 MG/DL
EST. AVERAGE GLUCOSE BLD GHB EST-MCNC: 105 MG/DL
FASTING STATUS PATIENT QL REPORTED: YES
HBA1C MFR BLD: 5.3 %
HDLC SERPL-MCNC: 48 MG/DL
LDLC SERPL CALC-MCNC: 93 MG/DL
NONHDLC SERPL-MCNC: 113 MG/DL
TRIGL SERPL-MCNC: 101 MG/DL

## 2025-06-30 PROCEDURE — 36415 COLL VENOUS BLD VENIPUNCTURE: CPT | Mod: ZL | Performed by: FAMILY MEDICINE

## 2025-06-30 PROCEDURE — 3044F HG A1C LEVEL LT 7.0%: CPT | Performed by: FAMILY MEDICINE

## 2025-06-30 PROCEDURE — 3074F SYST BP LT 130 MM HG: CPT | Performed by: FAMILY MEDICINE

## 2025-06-30 PROCEDURE — 83036 HEMOGLOBIN GLYCOSYLATED A1C: CPT | Mod: ZL | Performed by: FAMILY MEDICINE

## 2025-06-30 PROCEDURE — 3078F DIAST BP <80 MM HG: CPT | Performed by: FAMILY MEDICINE

## 2025-06-30 PROCEDURE — 82465 ASSAY BLD/SERUM CHOLESTEROL: CPT | Mod: ZL | Performed by: FAMILY MEDICINE

## 2025-06-30 PROCEDURE — 1125F AMNT PAIN NOTED PAIN PRSNT: CPT | Performed by: FAMILY MEDICINE

## 2025-06-30 PROCEDURE — 99214 OFFICE O/P EST MOD 30 MIN: CPT | Performed by: FAMILY MEDICINE

## 2025-06-30 PROCEDURE — G2211 COMPLEX E/M VISIT ADD ON: HCPCS | Performed by: FAMILY MEDICINE

## 2025-06-30 PROCEDURE — 3048F LDL-C <100 MG/DL: CPT | Performed by: FAMILY MEDICINE

## 2025-06-30 RX ORDER — ROSUVASTATIN CALCIUM 20 MG/1
20 TABLET, COATED ORAL DAILY
Qty: 90 TABLET | Refills: 4 | Status: SHIPPED | OUTPATIENT
Start: 2025-06-30

## 2025-06-30 ASSESSMENT — PAIN SCALES - GENERAL: PAINLEVEL_OUTOF10: MILD PAIN (2)

## 2025-06-30 NOTE — PROGRESS NOTES
"  Assessment & Plan     (E11.9) Type 2 diabetes mellitus without complication, without long-term current use of insulin (H)  (primary encounter diagnosis)  Comment: she does not want to increase the ozempic dose at this time. Great control with a low A1c now  Plan: Hemoglobin A1c         No med changes    (I10) Benign essential hypertension  Comment: is now at gao, with weight loss likely playing a significant role. No changes to her meds today.   Plan:      (E78.49) Other hyperlipidemia  Comment: increased this dose from 10 milligram to 20 milligram. Compliance is an ongoing challenge, mostly based on the size of the pills themselves.   Plan: Lipid Panel, rosuvastatin (CRESTOR) 20 MG         tablet             The longitudinal plan of care for the diagnosis(es)/condition(s) as documented were addressed during this visit. Due to the added complexity in care, I will continue to support Julia in the subsequent management and with ongoing continuity of care.            BMI  Estimated body mass index is 27.38 kg/m  as calculated from the following:    Height as of 3/21/25: 1.638 m (5' 4.5\").    Weight as of this encounter: 73.5 kg (162 lb).             Dalila Dumont is a 55 year old, presenting for the following health issues:  Diabetes      6/30/2025     8:08 AM   Additional Questions   Roomed by KALLI Bennett   Accompanied by Self         6/30/2025     8:08 AM   Patient Reported Additional Medications   Patient reports taking the following new medications N/A     History of Present Illness       Hypertension: She presents for follow up of hypertension.  She does not check blood pressure  regularly outside of the clinic. Outpatient blood pressures have not been over 140/90. She does not follow a low salt diet.     She eats 0-1 servings of fruits and vegetables daily.She consumes 1 sweetened beverage(s) daily.She exercises with enough effort to increase her heart rate 10 to 19 minutes per day.  She exercises with " "enough effort to increase her heart rate 3 or less days per week.   She is taking medications regularly.          Diabetes Follow-up    How often are you checking your blood sugar? Not at all  What concerns do you have today about your diabetes? None   Do you have any of these symptoms? (Select all that apply)  Weight loss  Have you had a diabetic eye exam in the last 12 months? No        BP Readings from Last 2 Encounters:   06/30/25 110/68   03/21/25 134/82     Hemoglobin A1C (%)   Date Value   03/21/2025 9.9 (H)   10/25/2023 6.0   03/09/2021 8.7 (H)     LDL Cholesterol Calculated (mg/dL)   Date Value   03/21/2025 234 (H)   10/25/2023 107 (H)   03/09/2021 220 (H)   11/26/2019 185 (H)         How many servings of fruits and vegetables do you eat daily?  0-1  On average, how many sweetened beverages do you drink each day (Examples: soda, juice, sweet tea, etc.  Do NOT count diet or artificially sweetened beverages)?   1  How many days per week do you exercise enough to make your heart beat faster? 3 or less  How many minutes a day do you exercise enough to make your heart beat faster? 10 - 19  How many days per week do you miss taking your medication? 0      Started ozempic 3 months ago. She is tolerating it well, no nausea at all. Has to get her mind around the injection itself. Is eating just one mail a day on it, with \"zero appetite\". Has lost over 10# so far on it.     She has not been rosie to swallow her norvasc, so stopped it. Pill was too large. Struggles with swallowing all pills.     Her cholesterol also was very high in March as well. Willing to increase her crestor dose.           Objective    /68   Pulse 100   Temp 97.5  F (36.4  C) (Temporal)   Resp 14   Wt 73.5 kg (162 lb)   LMP  (LMP Unknown)   SpO2 99%   BMI 27.38 kg/m    Body mass index is 27.38 kg/m .  Physical Exam   GENERAL: alert and no distress  RESP: lungs clear to auscultation - no rales, rhonchi or wheezes  CV: regular rate and " rhythm, normal S1 S2, no S3 or S4, no murmur, click or rub, no peripheral edema   SKIN: no suspicious lesions or rashes  PSYCH: mentation appears normal, affect normal/bright    Results for orders placed or performed in visit on 06/30/25   Hemoglobin A1c     Status: Normal   Result Value Ref Range    Estimated Average Glucose 105 <117 mg/dL    Hemoglobin A1C 5.3 <5.7 %   Lipid Panel     Status: Abnormal   Result Value Ref Range    Cholesterol 161 <200 mg/dL    Triglycerides 101 <150 mg/dL    Direct Measure HDL 48 (L) >=50 mg/dL    LDL Cholesterol Calculated 93 <100 mg/dL    Non HDL Cholesterol 113 <130 mg/dL    Patient Fasting > 8hrs? Yes     Narrative    Cholesterol  Desirable: < 200 mg/dL  Borderline High: 200 - 239 mg/dL  High: >= 240 mg/dL    Triglycerides  Normal: < 150 mg/dL  Borderline High: 150 - 199 mg/dL  High: 200-499 mg/dL  Very High: >= 500 mg/dL    Direct Measure HDL  Female: >= 50 mg/dL   Male: >= 40 mg/dL    LDL Cholesterol  Desirable: < 100 mg/dL  Above Desirable: 100 - 129 mg/dL   Borderline High: 130 - 159 mg/dL   High:  160 - 189 mg/dL   Very High: >= 190 mg/dL    Non HDL Cholesterol  Desirable: < 130 mg/dL  Above Desirable: 130 - 159 mg/dL  Borderline High: 160 - 189 mg/dL  High: 190 - 219 mg/dL  Very High: >= 220 mg/dL             Signed Electronically by: Shorty Fajardo MD

## 2025-06-30 NOTE — NURSING NOTE
"Chief Complaint   Patient presents with    Diabetes       Initial /68   Pulse 100   Temp 97.5  F (36.4  C) (Temporal)   Resp 14   Wt 73.5 kg (162 lb)   LMP  (LMP Unknown)   SpO2 99%   BMI 27.38 kg/m   Estimated body mass index is 27.38 kg/m  as calculated from the following:    Height as of 3/21/25: 1.638 m (5' 4.5\").    Weight as of this encounter: 73.5 kg (162 lb).  Medication Reconciliation: complete        Sabrina South LPN      "

## 2025-07-01 ENCOUNTER — RESULTS FOLLOW-UP (OUTPATIENT)
Dept: FAMILY MEDICINE | Facility: OTHER | Age: 55
End: 2025-07-01
Payer: COMMERCIAL

## 2025-08-14 DIAGNOSIS — E11.9 TYPE 2 DIABETES MELLITUS WITHOUT COMPLICATION, UNSPECIFIED WHETHER LONG TERM INSULIN USE (H): ICD-10-CM

## 2025-08-14 RX ORDER — SEMAGLUTIDE 0.68 MG/ML
INJECTION, SOLUTION SUBCUTANEOUS
Qty: 3 ML | Refills: 1 | Status: SHIPPED | OUTPATIENT
Start: 2025-08-14